# Patient Record
Sex: MALE | Race: WHITE | Employment: OTHER | URBAN - METROPOLITAN AREA
[De-identification: names, ages, dates, MRNs, and addresses within clinical notes are randomized per-mention and may not be internally consistent; named-entity substitution may affect disease eponyms.]

---

## 2017-02-27 ENCOUNTER — DOCUMENTATION ONLY (OUTPATIENT)
Dept: INTERNAL MEDICINE CLINIC | Age: 82
End: 2017-02-27

## 2017-02-27 NOTE — PROGRESS NOTES
Patient is listed on Passport report for ED visit to [address] [place] [address]. Verified in [facility] records that patient was [address] [place] [address] ED on 2/24 for c/o upper back pain s/p fall off of a step stool 2 days prior. XR negative. Patient is currently in [address] [place] [address] ED for c/o dizziness. Patient has appointment scheduled tomorrow 2/28 at 11:10 am with HINA Chavez. Plan to follow up with patient/Bonnie at that time to assess for any further needs.

## 2017-02-28 ENCOUNTER — DOCUMENTATION ONLY (OUTPATIENT)
Dept: INTERNAL MEDICINE CLINIC | Age: 82
End: 2017-02-28

## 2017-02-28 NOTE — PROGRESS NOTES
Patient has completed 30 day transition of care. Attended follow up appointment. No hospital readmissions and Goals met. No other needs identified to Nurse Navigator at this time. Resolving episode.

## 2017-03-01 ENCOUNTER — PATIENT OUTREACH (OUTPATIENT)
Dept: INTERNAL MEDICINE CLINIC | Age: 82
End: 2017-03-01

## 2017-03-01 NOTE — PROGRESS NOTES
NNTOCED    Patient was in [facility] ED on 2/24 with c/o upper back pain s/p fall off of a step stool, and again on 2/27 with c/o dizziness. XR of thoracic spine negative. Patient declined head CT vs cervical spine CT. He was given a dose of meclizine. Patient was scheduled to see Obey Sender, PA yesterday, but appointment was canceled. Contacted the patient and his wife for follow up. He reports he fell about one month ago and sprained his fingers while in [place]. He has a follow up with Dr. Angy Paula to discuss possible hand surgery. Still c/o dizziness. Reports he is somewhat better, but not much. He has had a couple of recent falls and attributes them to his left leg issues since tendon surgery a while ago. He uses a cane, but not at all times. Loses his balance about once per week without falls. While in [place] he was going to , but has not resumed this since returning to [place]. Scheduled follow up with Dr. Lyndsay Rinaldi on Monday 3/6 at 11:30 am. He repeated back appointment information and states plan to attend.

## 2017-03-06 ENCOUNTER — OFFICE VISIT (OUTPATIENT)
Dept: INTERNAL MEDICINE CLINIC | Age: 82
End: 2017-03-06

## 2017-03-06 VITALS
BODY MASS INDEX: 22.48 KG/M2 | DIASTOLIC BLOOD PRESSURE: 80 MMHG | TEMPERATURE: 97.7 F | WEIGHT: 157 LBS | OXYGEN SATURATION: 99 % | HEIGHT: 70 IN | SYSTOLIC BLOOD PRESSURE: 132 MMHG | RESPIRATION RATE: 16 BRPM | HEART RATE: 63 BPM

## 2017-03-06 DIAGNOSIS — R29.6 FREQUENT FALLS: ICD-10-CM

## 2017-03-06 DIAGNOSIS — G30.8 ALZHEIMER'S DISEASE OF OTHER ONSET WITH BEHAVIORAL DISTURBANCE: ICD-10-CM

## 2017-03-06 DIAGNOSIS — R26.89 BALANCE DISORDER: ICD-10-CM

## 2017-03-06 DIAGNOSIS — R53.81 DEBILITATED PATIENT: ICD-10-CM

## 2017-03-06 DIAGNOSIS — F34.1 DYSTHYMIA: ICD-10-CM

## 2017-03-06 DIAGNOSIS — F02.818 ALZHEIMER'S DISEASE OF OTHER ONSET WITH BEHAVIORAL DISTURBANCE: ICD-10-CM

## 2017-03-06 DIAGNOSIS — R41.89 COGNITIVE DECLINE: ICD-10-CM

## 2017-03-06 DIAGNOSIS — J42 CHRONIC BRONCHITIS, UNSPECIFIED CHRONIC BRONCHITIS TYPE (HCC): Primary | ICD-10-CM

## 2017-03-06 RX ORDER — ALPRAZOLAM 0.25 MG/1
0.25 TABLET ORAL
COMMUNITY
Start: 2017-03-02 | End: 2017-03-15

## 2017-03-06 NOTE — MR AVS SNAPSHOT
Visit Information Date & Time Provider Department Dept. Phone Encounter #  
 3/6/2017 11:30 AM Chiquis Isidro MD [facility] [phone] [id number] Upcoming Health Maintenance Date Due  
 GLAUCOMA SCREENING Q2Y 6/30/2015 INFLUENZA AGE 9 TO ADULT 8/1/2016 MEDICARE YEARLY EXAM 10/12/2017 DTaP/Tdap/Td series (2 - Td) 6/11/2023 Allergies as of 3/6/2017  Review Complete On: 3/6/2017 By: Sandeep Fernandez LPN Severity Noted Reaction Type Reactions Levaquin [Levofloxacin] High 04/04/2016    Other (comments) Vinh's tendonitis with rupture Pcn [Penicillins]  04/08/2010    Anaphylaxis Sulfa (Sulfonamide Antibiotics)  04/08/2010    Unknown (comments) Current Immunizations  Reviewed on 3/6/2017 Name Date Influenza High Dose Vaccine PF 10/1/2016 Pneumococcal Conjugate (PCV-13) 10/1/2016 Pneumococcal Vaccine (Unspecified Type) 5/13/2010, 1/1/2003 Tdap 6/11/2013 Zoster 1/1/2009 Reviewed by Sandeep Fernandez LPN on 0/4/6113 at 66:84 AM  
Vitals BP Pulse Temp Resp Height(growth percentile) Weight(growth percentile) 132/80 63 97.7 °F (36.5 °C) (Oral) 16 5' 10\" (1.778 m) 157 lb (71.2 kg) SpO2 BMI Smoking Status 99% 22.53 kg/m2 Never Smoker Vitals History BMI and BSA Data Body Mass Index Body Surface Area  
 22.53 kg/m 2 1.88 m 2 Preferred Pharmacy Pharmacy Name Phone [id number] [facility] [phone] Your Updated Medication List  
  
   
This list is accurate as of: 3/6/17 12:11 PM.  Always use your most recent med list.  
  
  
  
  
 * albuterol 90 mcg/actuation inhaler Commonly known as:  PROVENTIL HFA, VENTOLIN HFA, PROAIR HFA Take 2 Puffs by inhalation every four (4) hours as needed. * albuterol 2.5 mg /3 mL (0.083 %) nebulizer solution Commonly known as:  PROVENTIL VENTOLIN  
 2.5 mg by Nebulization route every four (4) hours as needed for Wheezing. * albuterol sulfate 2.5 mg/0.5 mL Nebu nebulizer solution Commonly known as:  PROVENTIL;VENTOLIN  
0.5 mL by Nebulization route every six (6) hours as needed for Wheezing or Respiratory Distress (Use with Ipratropium for nebulizer). albuterol-ipratropium 2.5 mg-0.5 mg/3 ml Nebu Commonly known as:  DUO-NEB  
3 mL by Nebulization route every six (6) hours as needed. ALPRAZolam 0.25 mg tablet Commonly known as:  Kaleta Breanne Take 0.25 mg by mouth two (2) times daily as needed. atorvastatin 10 mg tablet Commonly known as:  LIPITOR Take 1 Tab by mouth nightly. budesonide-formoterol 160-4.5 mcg/actuation HFA inhaler Commonly known as:  SYMBICORT Take 2 Puffs by inhalation two (2) times a day. cholecalciferol 1,000 unit tablet Commonly known as:  VITAMIN D3 Take 1,000 Units by mouth daily. citalopram 20 mg tablet Commonly known as:  Wanetta Never Take 1 Tab by mouth daily. donepezil 10 mg tablet Commonly known as:  ARICEPT Take 1 Tab by mouth nightly. Going to [place] EPINEPHrine 0.3 mg/0.3 mL injection Commonly known as:  EPIPEN  
0.3 mL by IntraMUSCular route once as needed for 1 dose. ipratropium 0.02 % nebulizer solution Commonly known as:  ATROVENT  
2.5 mL by Nebulization route every six (6) hours as needed for Wheezing (use with albuterol for nebulizer 0.5). lisinopril 10 mg tablet Commonly known as:  Boni Hu Take 1 Tab by mouth daily. MUCINEX 600 mg ER tablet Generic drug:  guaiFENesin ER Take 600 mg by mouth daily. umeclidinium 62.5 mcg/actuation inhaler Commonly known as:  INCRUSE ELLIPTA Take 1 Puff by inhalation daily. * Notice: This list has 3 medication(s) that are the same as other medications prescribed for you. Read the directions carefully, and ask your doctor or other care provider to review them with you. Introducing [facility]! Dear Ebony Contreras: Thank you for requesting a [employer] account. Our records indicate that you already have an active "[employer] account. You can access your account anytime at [email or web address] Did you know that you can access your hospital and ER discharge instructions at any time in "[employer]? You can also review all of your test results from your hospital stay or ER visit. Additional Information If you have questions, please visit the Frequently Asked Questions section of the "[employer] website at [email or web address] Remember, "[employer] is NOT to be used for urgent needs. For medical emergencies, dial 911. Now available from your iPhone and Android! Please provide this summary of care documentation to your next provider. Your primary care clinician is listed as Brooklynn De Jesus. If you have any questions after today's visit, please call [phone].

## 2017-03-06 NOTE — PROGRESS NOTES
Chief Complaint   Patient presents with   Giuliano Manzano     while in [place] Shoulder Pain     left side, using Ibufropen 800 mg and took 1 Hydrocodone     Seen ER 2/24/17  For back pain and dizzy 2/27/17, hand injury neck pain breathing all ongoing peoblems  Subjective:  Mr. Yoko Santiago is here with his wife. He was in [place] for over two months. Had a very bad time. Had one hospitalization, many doctor visits, got seen by pulmonary, got seen by an internist, got seen by a neurologist, had PT, had an ER visit, had a flare of his COPD, has had increased balance, walking, falling issues. Still has never fully recovered from the Achilles tendon rupture. He still trips. His memory issues have been worse. He was placed on Namenda in addition to his Aricept. He has side effects from the 5 mg twice a day dose, so it was stopped. The family has made an appointment to see neurology here, [facility]. They could not get an appointment, but they found an appointment to see the nurse practitioner. Patient's COPD has been bad, although better now. Many courses of antibiotics. He has an appointment to see Dr. Cristian Carroll. Orthopedic issues, neck pain has been a problem. He has multilevel degenerative changes on his xrays. He has a history of many other orthopedic issues. He injured a finger, he saw Mich Raymond. He's in a splint. He's had shoulder problems, for which he has seen Jose Walker. He is much less functional.  His appetite is a little bit better now, although still poor. Physical Examination:  VITALS:  His blood pressure was okay. He was not orthostatic. He was thin. CHEST:  His lungs were clear. ABDOMEN:  Soft. NEURO:  No focal neurological findings. Balance is a bit poor. He does not have good dorsiflexion, plantar flexions in his feet. Memory is poor. Cognitively he is declining. Impression/Plan:  1.  Dementia with cognitive decline, on Aricept. Could not tolerate Namenda. Neurology is going to make a comment. They gave him Xanax in [place] to use when he gets really agitated, as he does have ongoing psych issues, 0.25, and I told the family I would refill that as needed. They cannot use it with anything strong and no hydrocodone or narcotics. 2. For neck pain I recommended heat, range of motion, PT, but he has no PT benefit left. I recommended 600 mg or 800 mg ibuprofen as needed. Patient Active Problem List    Diagnosis    Dysthymia    Depression     Seeing DR eubanks [facility] psychiatry      Osteopenia    BRYANT on CPAP     Partially compliant        BPH (benign prostatic hyperplasia)    Personal history of colon cancer, stage III     1 positive node      chemorx for 6 months      GÓMEZ (mycobacterium avium-intracellulare) ([facility]    CAD (coronary artery disease)    COPD (chronic obstructive pulmonary disease) (HCC)     Asthma        HTN (hypertension)    Melanoma ([facility]     Vitals:    03/06/17 1130 03/06/17 1157   BP: (!) 164/95 132/80  Comment: standing   BP 1 Location: Left arm    BP Patient Position: Sitting    Pulse: 63    Resp: 16    Temp: 97.7 °F (36.5 °C)    TempSrc: Oral    SpO2: 99%    Weight: 157 lb (71.2 kg)    Height: 5' 10\" (1.778 m)      Cheryl Bravo was seen today for fall and shoulder pain.     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

## 2017-03-13 ENCOUNTER — DOCUMENTATION ONLY (OUTPATIENT)
Dept: INTERNAL MEDICINE CLINIC | Age: 82
End: 2017-03-13

## 2017-03-13 NOTE — PROGRESS NOTES
Patient is listed on Passport report for admission to [address] [place] [address] 3/10-present for fall with spinal fractures to T2, T4, T12, and L1. Plan to follow up with patient after discharge.

## 2017-03-15 ENCOUNTER — PATIENT OUTREACH (OUTPATIENT)
Dept: INTERNAL MEDICINE CLINIC | Age: 82
End: 2017-03-15

## 2017-03-15 RX ORDER — LISINOPRIL 2.5 MG/1
10 TABLET ORAL DAILY
COMMUNITY
End: 2017-04-21 | Stop reason: DRUGHIGH

## 2017-03-15 RX ORDER — MEMANTINE HYDROCHLORIDE 10 MG/1
10 TABLET ORAL 2 TIMES DAILY
COMMUNITY
End: 2017-03-30 | Stop reason: DRUGHIGH

## 2017-03-15 RX ORDER — QUETIAPINE FUMARATE 25 MG/1
12.5 TABLET, FILM COATED ORAL
COMMUNITY
End: 2017-06-02 | Stop reason: ALTCHOICE

## 2017-03-15 NOTE — PROGRESS NOTES
NNTOCIP    Patient was listed on Passport report for admission to [facility] 3/10-3/14 s/p fall with spinal fractures. CT showed old T4, T12, & L1 fractures, and possibly new T4 fracture. Orthopedics was consulted and recommended no surgery. Pain was controlled. PLAN: Discharged to [address] [facility] for rehab. Check BP daily. NEW MEDICATIONS: Seroquel, Namenda  D/C MEDICATIONS: Xanax  MEDICATION CHANGES: lisinopril decreased from 10 mg to 2.5 mg daily  Updated Med Rec according to D/C summary. Meds in [facility] that are not listed on D/C summary have been marked 'unknown'. Will follow up with SW at [facility] in a few weeks to discuss d/c plans.

## 2017-03-30 ENCOUNTER — OFFICE VISIT (OUTPATIENT)
Dept: INTERNAL MEDICINE CLINIC | Age: 82
End: 2017-03-30

## 2017-03-30 VITALS
TEMPERATURE: 97.6 F | OXYGEN SATURATION: 98 % | HEART RATE: 88 BPM | DIASTOLIC BLOOD PRESSURE: 73 MMHG | BODY MASS INDEX: 22.48 KG/M2 | SYSTOLIC BLOOD PRESSURE: 128 MMHG | WEIGHT: 157 LBS | RESPIRATION RATE: 16 BRPM | HEIGHT: 70 IN

## 2017-03-30 DIAGNOSIS — S22.081A: ICD-10-CM

## 2017-03-30 DIAGNOSIS — J42 CHRONIC BRONCHITIS, UNSPECIFIED CHRONIC BRONCHITIS TYPE (HCC): ICD-10-CM

## 2017-03-30 DIAGNOSIS — M85.80 OSTEOPENIA: Primary | ICD-10-CM

## 2017-03-30 RX ORDER — MEMANTINE HYDROCHLORIDE 5 MG/1
5 TABLET ORAL 2 TIMES DAILY
COMMUNITY
End: 2017-04-21 | Stop reason: ALTCHOICE

## 2017-03-30 RX ORDER — PREDNISONE 20 MG/1
40 TABLET ORAL
Qty: 10 TAB | Refills: 0 | Status: SHIPPED | OUTPATIENT
Start: 2017-03-30 | End: 2017-03-30 | Stop reason: SDUPTHER

## 2017-03-30 RX ORDER — PREDNISONE 20 MG/1
40 TABLET ORAL
Qty: 10 TAB | Refills: 0 | Status: SHIPPED | OUTPATIENT
Start: 2017-03-30 | End: 2017-04-21 | Stop reason: ALTCHOICE

## 2017-03-30 NOTE — MR AVS SNAPSHOT
Visit Information Date & Time Provider Department Dept. Phone Encounter #  
 3/30/2017  2:45 PM Gretta Bloch, [facility] [phone] [id number] Upcoming Health Maintenance Date Due  
 GLAUCOMA SCREENING Q2Y 6/30/2015 MEDICARE YEARLY EXAM 10/12/2017 DTaP/Tdap/Td series (2 - Td) 6/11/2023 Allergies as of 3/30/2017  Review Complete On: 3/30/2017 By: Andressa Cook LPN Severity Noted Reaction Type Reactions Levaquin [Levofloxacin] High 04/04/2016    Other (comments) Vinh's tendonitis with rupture Pcn [Penicillins]  04/08/2010    Anaphylaxis Sulfa (Sulfonamide Antibiotics)  04/08/2010    Unknown (comments) Current Immunizations  Reviewed on 3/6/2017 Name Date Influenza High Dose Vaccine PF 10/1/2016 Pneumococcal Conjugate (PCV-13) 10/1/2016 Pneumococcal Vaccine (Unspecified Type) 5/13/2010, 1/1/2003 Tdap 6/11/2013 Zoster 1/1/2009 Not reviewed this visit You Were Diagnosed With   
  
 Codes Comments Osteopenia    -  Primary ICD-10-CM: M85.80 ICD-9-CM: 733.90   
 T12 burst fracture, closed, initial encounter ([facility]     ICD-10-CM: E53.223P ICD-9-CM: 879. 2 Chronic bronchitis, unspecified chronic bronchitis type ([facility]     ICD-10-CM: A09 ICD-9-CM: 491.9 Vitals BP Pulse Temp Resp Height(growth percentile) Weight(growth percentile) 128/73 (BP 1 Location: Left arm, BP Patient Position: Sitting) 88 97.6 °F (36.4 °C) (Oral) 16 5' 10\" (1.778 m) 157 lb (71.2 kg) SpO2 BMI Smoking Status 98% 22.53 kg/m2 Never Smoker Vitals History BMI and BSA Data Body Mass Index Body Surface Area  
 22.53 kg/m 2 1.88 m 2 Preferred Pharmacy Pharmacy Name Phone [address] [address] [phone] Your Updated Medication List  
  
   
This list is accurate as of: 3/30/17  3:46 PM.  Always use your most recent med list.  
  
  
 * albuterol 90 mcg/actuation inhaler Commonly known as:  PROVENTIL HFA, VENTOLIN HFA, PROAIR HFA Take 2 Puffs by inhalation every four (4) hours as needed. * albuterol sulfate 2.5 mg/0.5 mL Nebu nebulizer solution Commonly known as:  PROVENTIL;VENTOLIN  
0.5 mL by Nebulization route every six (6) hours as needed for Wheezing or Respiratory Distress (Use with Ipratropium for nebulizer). albuterol-ipratropium 2.5 mg-0.5 mg/3 ml Nebu Commonly known as:  DUO-NEB  
3 mL by Nebulization route every six (6) hours as needed. atorvastatin 10 mg tablet Commonly known as:  LIPITOR Take 1 Tab by mouth nightly. budesonide-formoterol 160-4.5 mcg/actuation HFA inhaler Commonly known as:  SYMBICORT Take 2 Puffs by inhalation two (2) times a day. cholecalciferol 1,000 unit tablet Commonly known as:  VITAMIN D3 Take 1,000 Units by mouth daily. citalopram 20 mg tablet Commonly known as:  Lajuanda Gearing Take 1 Tab by mouth daily. donepezil 10 mg tablet Commonly known as:  ARICEPT Take 1 Tab by mouth nightly. Going to [place] EPINEPHrine 0.3 mg/0.3 mL injection Commonly known as:  EPIPEN  
0.3 mL by IntraMUSCular route once as needed for 1 dose. ipratropium 0.02 % nebulizer solution Commonly known as:  ATROVENT  
2.5 mL by Nebulization route every six (6) hours as needed for Wheezing (use with albuterol for nebulizer 0.5). lisinopril 2.5 mg tablet Commonly known as:  Mita Leti Take 2.5 mg by mouth daily. MUCINEX 600 mg ER tablet Generic drug:  guaiFENesin ER Take 600 mg by mouth daily. NAMENDA 5 mg tablet Generic drug:  memantine Take 5 mg by mouth two (2) times a day. predniSONE 20 mg tablet Commonly known as:  Rochelle Mons Take 2 Tabs by mouth daily (with breakfast). SEROquel 25 mg tablet Generic drug:  QUEtiapine Take 12.5 mg by mouth nightly as needed (agitation). umeclidinium 62.5 mcg/actuation inhaler Commonly known as:  INCRUSE ELLIPTA Take 1 Puff by inhalation daily. * Notice: This list has 2 medication(s) that are the same as other medications prescribed for you. Read the directions carefully, and ask your doctor or other care provider to review them with you. Prescriptions Sent to Pharmacy Refills  
 predniSONE (DELTASONE) 20 mg tablet 0 Sig: Take 2 Tabs by mouth daily (with breakfast). Class: Normal  
 Pharmacy: [address] [place] [address] 18, [address] #: [phone] Route: Oral  
  
To-Do List   
 03/30/2017 Imaging:  DEXA BONE DENSITY STUDY AXIAL [facility]! Dear She Elizabeth: Thank you for requesting a [employer] account. Our records indicate that you already have an active [employer] account. You can access your account anytime at [email or web address] Did you know that you can access your hospital and ER discharge instructions at any time in [employer]? You can also review all of your test results from your hospital stay or ER visit. Additional Information If you have questions, please visit the Frequently Asked Questions section of the [employer] website at [email or web address]/. Remember, [employer] is NOT to be used for urgent needs. For medical emergencies, dial 911. Now available from your iPhone and Android! Please provide this summary of care documentation to your next provider. Your primary care clinician is listed as Portia Arndt. If you have any questions after today's visit, please call [phone].

## 2017-03-30 NOTE — PROGRESS NOTES
Chief Complaint   Patient presents with    Cough     night time cough, given Doxycycline 100 mg BID for 7 days from pulmonary today   seen pulm associates    Patient was listed on Passport report for admission to [facility] 3/10-3/14 s/p fall with spinal fractures. CT showed old T4, T12, & L1 fractures, and possibly new T4 fracture. Orthopedics was consulted and recommended no surgery. Pain was controlled.     PLAN: Discharged to [facility] for rehab. Check BP daily. NEW MEDICATIONS: SeromicaelalDanynda  D/C MEDICATIONS: Xanax  MEDICATION CHANGES: lisinopril decreased from 10 mg to 2.5 mg daily  Updated Med Rec according to D/C summary. Meds in [facility] that are not listed on D/C summary have been marked 'unknown'.     discharged yesterday  Cough congestion started doxycytcline by pulmonary    Vitals:    03/30/17 1508   BP: 128/73   Pulse: 88   Resp: 16   Temp: 97.6 °F (36.4 °C)   TempSrc: Oral   SpO2: 98%   Weight: 157 lb (71.2 kg)   Height: 5' 10\" (1.778 m)     no apparent distress  Tender kyphosis  Lungs prolonged expir no wheeze    Add prednisone      Antonella Brower was seen today for cough. Diagnoses and all orders for this visit:    Osteopenia  -     DEXA BONE DENSITY STUDY AXIAL; Future    T12 burst fracture, closed, initial encounter ([facility]  -     DEXA BONE DENSITY STUDY AXIAL; Future    Chronic bronchitis, unspecified chronic bronchitis type ([facility]    Other orders  -     Discontinue: predniSONE (DELTASONE) 20 mg tablet; Take 2 Tabs by mouth daily (with breakfast). -     predniSONE (DELTASONE) 20 mg tablet; Take 2 Tabs by mouth daily (with breakfast).       home health for PT  Case reviewed with nurse navigator

## 2017-04-05 ENCOUNTER — HOSPITAL ENCOUNTER (OUTPATIENT)
Dept: MAMMOGRAPHY | Age: 82
Discharge: HOME | End: 2017-04-05
Attending: INTERNAL MEDICINE
Payer: MEDICARE

## 2017-04-05 DIAGNOSIS — M85.80 OSTEOPENIA: ICD-10-CM

## 2017-04-05 DIAGNOSIS — S22.081A: ICD-10-CM

## 2017-04-05 PROCEDURE — 77080 DXA BONE DENSITY AXIAL: CPT

## 2017-04-07 NOTE — PROGRESS NOTES
Writer spoke with patients wife debra and informed her that patient bone density is abnormal, needs appointment to review treatment options.  Malik Mckinley expressed verbal understanding and has made an appointment for April 18th 2 8:30am

## 2017-04-18 ENCOUNTER — PATIENT OUTREACH (OUTPATIENT)
Dept: INTERNAL MEDICINE CLINIC | Age: 82
End: 2017-04-18

## 2017-04-18 ENCOUNTER — TELEPHONE (OUTPATIENT)
Dept: INTERNAL MEDICINE CLINIC | Age: 82
End: 2017-04-18

## 2017-04-18 NOTE — PROGRESS NOTES
Patient was admitted to [facility] 3/10-3/14 s/p fall with spinal fractures. CT showed old T4, T12, & L1 fractures, and possibly new T4 fracture. Orthopedics was consulted and recommended no surgery. Pain was controlled. Admitted to [facility] and Rehab 3/14-3/29. Discharged home to [place] [address]. with his wife. Patient was scheduled to see Dr. Kenia Baez today, but appointment was canceled d/t another admission to [facility] yesterday 4/17 for a fall with L4 compression fracture. Surgery will be consulted for possible kyphoplasty. Plan to f/u with patient/wife after discharge. Resolving current ANTONIO episode.  Notifying Ed with the [facility] High Risk team.

## 2017-04-18 NOTE — TELEPHONE ENCOUNTER
Patient's wife called to cancel appt this morning because her  fell yesterday and she says he is in the hospital badly bruised and in bad shape.

## 2017-04-21 ENCOUNTER — PATIENT OUTREACH (OUTPATIENT)
Dept: INTERNAL MEDICINE CLINIC | Age: 82
End: 2017-04-21

## 2017-04-21 RX ORDER — METHOCARBAMOL 500 MG/1
500 TABLET, FILM COATED ORAL
COMMUNITY
End: 2017-07-10

## 2017-04-21 RX ORDER — VITAMIN B COMPLEX
2500 TABLET ORAL DAILY
COMMUNITY
End: 2017-10-24 | Stop reason: ALTCHOICE

## 2017-04-21 RX ORDER — HYDROCODONE BITARTRATE AND ACETAMINOPHEN 5; 325 MG/1; MG/1
1 TABLET ORAL
COMMUNITY
End: 2017-04-24 | Stop reason: SDUPTHER

## 2017-04-21 RX ORDER — LISINOPRIL 10 MG/1
10 TABLET ORAL DAILY
COMMUNITY
End: 2017-06-07 | Stop reason: ALTCHOICE

## 2017-04-21 RX ORDER — ACETAMINOPHEN 325 MG/1
650 TABLET ORAL
COMMUNITY
End: 2017-12-04 | Stop reason: DRUGHIGH

## 2017-04-21 NOTE — PROGRESS NOTES
This note will not be viewable in [address] Patient was listed on Passport report for admission to [facility] - with L4 compression fracture s/p GLF. Patient has had multiple hospitalizations for falls r/t vertigo and decreased mobility of foot since Achilles tendon repair. Patient is non-compliant with fall precautions and assistive devices. PLAN: Discharged home with his wife. [facility] services. LSO back brace for comfort. Follow up with neurosurgery Dr. Tommie James in 6 weeks if needed and PCP in one week. NEW MEDICATIONS: Tylenol, Norco, Robaxin, Seroquel  DISCONTINUED MEDICATIONS: Aricept    Contacted patient's wife Marissa Omer, listed on HIPAA, for ANTONIO follow up. Verified patient's . She reports her  is still in a lot pain.  PT and RN will visit today. He has been using the rollator at all times now and she has been diligent about requiring him to use this. She is frustrated because she believes his dementia is the reason he forgets how to use assistive devices appropriately. Reviewed all medications and updated Med Rec. Lisinopril dose was decreased to 2.5 mg, but she is unsure why and has been giving him 10 mg daily. BP on d/c was 147/70. She will check his BP at home and call NN with reading. NN will review BP readings during admission. Discontinued the following medications per Dr. Ann Moerira verbal order.     Medications Discontinued During This Encounter   Medication Reason    memantine (NAMENDA) 5 mg tablet Discontinued by Another Clinician    predniSONE (DELTASONE) 20 mg tablet Therapy Completed    umeclidinium (INCRUSE ELLIPTA) 62.5 mcg/actuation inhaler Therapy Completed    albuterol-ipratropium (DUO-NEB) 2.5 mg-0.5 mg/3 ml nebu Discontinued by Another Clinician    EPINEPHrine (EPIPEN) 0.3 mg/0.3 mL injection Not A Current Medication    ipratropium (ATROVENT) 0.02 % nebulizer solution Discontinued by Another Clinician    lisinopril (PRINIVIL, ZESTRIL) 2.5 mg tablet Dose Adjustment       She is unable to bring patient in next week for ANTONIO follow up, as they have company visiting. Scheduled ANTONIO appointment Monday 5/1 at 8:30 am.    Goals Addressed      Prevent complications post hospitalization. 4/21/17: Patient will work with home PT to prevent falls and further injury. -SRW          Reviewed inpatient BP readings and they range 150s-180s/70s-80s. One reading was 134/82. Contacted Mrs. Young. She reports BP is 148/87. Advised her to continue giving him lisinopril 10 mg daily since this is the dose he has been on. The hospital most likely thought he was on 2.5 mg daily when he was admitted, hence why they said to continue taking this dose on discharge. It was not listed as a dose change on d/c paperwork. She verbalized understanding.

## 2017-04-24 ENCOUNTER — PATIENT OUTREACH (OUTPATIENT)
Dept: INTERNAL MEDICINE CLINIC | Age: 82
End: 2017-04-24

## 2017-04-24 RX ORDER — HYDROCODONE BITARTRATE AND ACETAMINOPHEN 5; 325 MG/1; MG/1
1 TABLET ORAL
Qty: 60 TAB | Refills: 0 | Status: SHIPPED | OUTPATIENT
Start: 2017-04-24 | End: 2017-05-01 | Stop reason: SDUPTHER

## 2017-04-24 NOTE — PROGRESS NOTES
Received VMM from patient's wife requesting a return call. Contacted Cristina and she reports the patient only has 2 tabs left of Norco. She has been alternating Norco and Tylenol every 4 hours. His pain is still pretty bad. She has tried calling the prescribing doctor for a refill, but is unable to get through. She has contacted Dr. Daryn Van office, neurosurgery, but because the patient is not established they are unable to prescribe anything. NN will discuss with Dr. Mojgan Lay and f/u with Mrs. Mily Roa. Discussed with Dr. Mojgan Lay and he has printed a Rx for Norco. Left detailed VMM for patient's wife notifying her that Rx is ready for .

## 2017-04-28 ENCOUNTER — PATIENT OUTREACH (OUTPATIENT)
Dept: INTERNAL MEDICINE CLINIC | Age: 82
End: 2017-04-28

## 2017-04-28 NOTE — PROGRESS NOTES
Received VMM from physical therapist with [facility]. He wanted to notify Dr. Gregory Pace that the patient has had 2 falls in the past 24 hours with no injury other than scrapes to his hands. PT has continued to educate the patient on fall safety and using his walker at all times, but the patient does not seem to understand or follow instructions. He is aware that the patient has an appointment Monday morning. NN will notify Dr. Gregory Pace.

## 2017-05-01 ENCOUNTER — OFFICE VISIT (OUTPATIENT)
Dept: INTERNAL MEDICINE CLINIC | Age: 82
End: 2017-05-01

## 2017-05-01 VITALS
OXYGEN SATURATION: 98 % | TEMPERATURE: 97.8 F | RESPIRATION RATE: 15 BRPM | HEART RATE: 74 BPM | HEIGHT: 70 IN | DIASTOLIC BLOOD PRESSURE: 76 MMHG | WEIGHT: 149 LBS | SYSTOLIC BLOOD PRESSURE: 127 MMHG | BODY MASS INDEX: 21.33 KG/M2

## 2017-05-01 DIAGNOSIS — S32.040A COMPRESSION FRACTURE OF L4 LUMBAR VERTEBRA, CLOSED, INITIAL ENCOUNTER (HCC): Primary | ICD-10-CM

## 2017-05-01 DIAGNOSIS — M80.00XA AGE-RELATED OSTEOPOROSIS WITH CURRENT PATHOLOGICAL FRACTURE, INITIAL ENCOUNTER: ICD-10-CM

## 2017-05-01 RX ORDER — HYDROCODONE BITARTRATE AND ACETAMINOPHEN 5; 325 MG/1; MG/1
TABLET ORAL
Qty: 100 TAB | Refills: 0 | Status: SHIPPED | OUTPATIENT
Start: 2017-05-01 | End: 2017-07-10

## 2017-05-01 RX ORDER — CALCITONIN SALMON 200 [IU]/.09ML
1 SPRAY, METERED NASAL DAILY
Qty: 3.7 ML | Refills: 0 | Status: SHIPPED | OUTPATIENT
Start: 2017-05-01 | End: 2017-07-10

## 2017-05-01 NOTE — PROGRESS NOTES
Chief Complaint   Patient presents with   [facility] Follow Up     L4 fracture             Patient was admitted to [facility] 3/10-3/14 s/p fall with spinal fractures. CT showed old T4, T12, & L1 fractures, and possibly new L4 fracture. Orthopedics was consulted and recommended no surgery. Pain was controlled. Admitted to [facility] and Rehab 3/14-3/29. [facility].           DEXA Results (most recent):    Results from [place] encounter on 04/05/17   DEXA BONE DENSITY STUDY AXIAL   Narrative Bone Mineral Density    Indication:  Osteopenia  Age: [de-identified]  Sex: Male. Number of falls in the past year:   None. Risk factors for osteoporosis:  Multiple fractures, steroid use      Current medication for osteoporosis: Vitamin D. Comparison:  2013    Technique: Imaging was performed on the [employer]     Excluded sites: L1 for decreased height and L4 for postoperative changes     Findings:     Fractures identified on Lateral scanogram:  L1 and T12    Femoral Neck:  Right  Bone mineral density (gm/cm2):? 0.745  % of peak bone mass: 70  % for age matched controls:? 80  T-score: -2.5  Z-score: -0.8    Total Hip: Right  Bone mineral density (gm/cm2):  0.801  % of peak bone mass:   73  % for age matched controls:  80  T-score:   -2.1  Z-score:  -0.8    Lumbar Spine:  L2-3  Bone mineral density (gm/cm2):  1.087  % of peak bone mass:  87   % for age matched controls:  95  T-score:  -1.4  Z-score:  -0.4    T score of the distal one third left radius -0.1         Impression Impression: This patient is osteoporotic using the World Health Organization criteria  As compared to the prior study, there has been a significant 5.2% decrease in  the lumbar spine and a significant 7.4% decrease in the right total hip. Please  assess calcium and vitamin D intake. Consider secondary causes for osteoporosis.       Recommendations:  Therapy recommendations need to be tailored to each individual patient. Please reconsider testing based on risk factors. Currently, Medicare will only  reimburse for a central DXA examination every two years, unless the patient is  on chronic glucocorticoid therapy. Note: Please note that reliable, valid comparisons cannot be made between  studies which have been performed on machines from different manufacturers. If  clinically warranted, a follow up study performed at this site, on the same  unit, would allow the most sensitive assessment of change in bone mineral  density. Saw neurosurgery to see again 5/31    Confused     Pain control not great using 5 to 6  Per day hydroocodone      . Vitals:    05/01/17 0837   BP: 127/76   Pulse: 74   Resp: 15   Temp: 97.8 °F (36.6 °C)   TempSrc: Oral   SpO2: 98%   Weight: 149 lb (67.6 kg)   Height: 5' 10\" (1.778 m)     Wt Readings from Last 3 Encounters:   05/01/17 149 lb (67.6 kg)   03/30/17 157 lb (71.2 kg)   03/06/17 157 lb (71.2 kg)       Chest clear    Brace tender lumbar  Using walker for ambulation        Cherise Duc was seen today for hospital follow up. Diagnoses and all orders for this visit:    Compression fracture of L4 lumbar vertebra, closed, initial encounter    Age-related osteoporosis with current pathological fracture, initial encounter    Other orders  -     HYDROcodone-acetaminophen (NORCO) 5-325 mg per tablet; One every 4 hours daytime  and 2 at night for pain  -     calcitonin, salmon, (MIACALCIN) nasal; 1 [place] by IntraNASal route daily. Add calcium with D  Later fosamax    Trial short term calcitonin spray for reduced pain      Mr. Manda Mcmullen is a [de-identified]y.o. year old male, he is seen today for Transition of Care services following a hospital discharge for compresiion on 4/20. Our office Nurse Navigator performed an outreach to Mr. Sindi Garrison on 4/21 (within 2 business days of discharge) to complete medication reconciliation and a telephonic assessment of his condition.

## 2017-05-01 NOTE — MR AVS SNAPSHOT
Visit Information Date & Time Provider Department Dept. Phone Encounter #  
 5/1/2017  8:30 AM Justin Pierson MD [facility] [phone] [id number] Upcoming Health Maintenance Date Due  
 GLAUCOMA SCREENING Q2Y 6/30/2015 INFLUENZA AGE 9 TO ADULT 8/1/2017 MEDICARE YEARLY EXAM 10/12/2017 DTaP/Tdap/Td series (2 - Td) 6/11/2023 Allergies as of 5/1/2017  Review Complete On: 5/1/2017 By: Mary Jane Stone LPN Severity Noted Reaction Type Reactions Levaquin [Levofloxacin] High 04/04/2016    Other (comments) Vinh's tendonitis with rupture Pcn [Penicillins]  04/08/2010    Anaphylaxis Sulfa (Sulfonamide Antibiotics)  04/08/2010    Unknown (comments) Current Immunizations  Reviewed on 3/6/2017 Name Date Influenza High Dose Vaccine PF 10/1/2016 Pneumococcal Conjugate (PCV-13) 10/1/2016 Pneumococcal Vaccine (Unspecified Type) 5/13/2010, 1/1/2003 Tdap 6/11/2013 Zoster 1/1/2009 Not reviewed this visit You Were Diagnosed With   
  
 Codes Comments Compression fracture of L4 lumbar vertebra, closed, initial encounter    -  Primary ICD-10-CM: K46.147U ICD-9-CM: 805.4 Vitals BP Pulse Temp Resp Height(growth percentile) Weight(growth percentile) 127/76 (BP 1 Location: Left arm, BP Patient Position: Sitting) 74 97.8 °F (36.6 °C) (Oral) 15 5' 10\" (1.778 m) 149 lb (67.6 kg) SpO2 BMI Smoking Status 98% 21.38 kg/m2 Never Smoker BMI and BSA Data Body Mass Index Body Surface Area  
 21.38 kg/m 2 1.83 m 2 Preferred Pharmacy Pharmacy Name Phone [facility] [phone] Your Updated Medication List  
  
   
This list is accurate as of: 5/1/17  9:06 AM.  Always use your most recent med list.  
  
  
  
  
 * albuterol 90 mcg/actuation inhaler Commonly known as:  PROVENTIL HFA, VENTOLIN HFA, PROAIR HFA  
 Take 2 Puffs by inhalation every four (4) hours as needed. * albuterol sulfate 2.5 mg/0.5 mL Nebu nebulizer solution Commonly known as:  PROVENTIL;VENTOLIN  
0.5 mL by Nebulization route every six (6) hours as needed for Wheezing or Respiratory Distress (Use with Ipratropium for nebulizer). atorvastatin 10 mg tablet Commonly known as:  LIPITOR Take 1 Tab by mouth nightly. budesonide-formoterol 160-4.5 mcg/actuation HFA inhaler Commonly known as:  SYMBICORT Take 2 Puffs by inhalation two (2) times a day. calcitonin (salmon) nasal  
Commonly known as:  MIACALCIN  
1 [place] by IntraNASal route daily. cholecalciferol 1,000 unit tablet Commonly known as:  VITAMIN D3 Take 1,000 Units by mouth daily. citalopram 20 mg tablet Commonly known as:  Lisa Costa Take 1 Tab by mouth daily. donepezil 10 mg tablet Commonly known as:  ARICEPT Take 1 Tab by mouth nightly. Going to [place] HYDROcodone-acetaminophen 5-325 mg per tablet Commonly known as:  Harini Rook One every 4 hours daytime and 2 at night for pain  
  
 lisinopril 10 mg tablet Commonly known as:  Pako Daft Take 10 mg by mouth daily. MUCINEX 600 mg ER tablet Generic drug:  guaiFENesin ER Take 600 mg by mouth daily as needed. ROBAXIN 500 mg tablet Generic drug:  methocarbamol Take 500 mg by mouth four (4) times daily as needed. SEROquel 25 mg tablet Generic drug:  QUEtiapine Take 12.5 mg by mouth nightly as needed (agitation). TYLENOL 325 mg tablet Generic drug:  acetaminophen Take 650 mg by mouth every four (4) hours as needed for Pain. VITAMIN B-12 2,500 mcg sublingual tablet Generic drug:  cyanocobalamin Take 2,500 mcg by mouth daily. * Notice: This list has 2 medication(s) that are the same as other medications prescribed for you. Read the directions carefully, and ask your doctor or other care provider to review them with you. Prescriptions Printed Refills HYDROcodone-acetaminophen (NORCO) 5-325 mg per tablet 0 Sig: One every 4 hours daytime 
and 2 at night for pain  
 Class: Print Prescriptions Sent to Pharmacy Refills  
 calcitonin, salmon, (MIACALCIN) nasal 0 [place] by IntraNASal route daily. Class: Normal  
 [facility] [id number] [address] #: [phone] Route: IntraNASal  
  
Introducing [employer]! Dear Melissa Kilgore: Thank you for requesting a [employer] account. Our records indicate that you already have an active [employer] account. You can access your account anytime at [email or web address] Did you know that you can access your hospital and ER discharge instructions at any time in [employer]? You can also review all of your test results from your hospital stay or ER visit. Additional Information If you have questions, please visit the Frequently Asked Questions section of the [employer] website at [email or web address] Remember, [employer] is NOT to be used for urgent needs. For medical emergencies, dial 911. Now available from your iPhone and Android! Please provide this summary of care documentation to your next provider. Your primary care clinician is listed as Paige Amezcua. If you have any questions after today's visit, please call [phone].

## 2017-05-05 DIAGNOSIS — J44.9 COPD (CHRONIC OBSTRUCTIVE PULMONARY DISEASE) WITH CHRONIC BRONCHITIS (HCC): ICD-10-CM

## 2017-05-09 RX ORDER — ALBUTEROL SULFATE 2.5 MG/.5ML
2.5 SOLUTION RESPIRATORY (INHALATION)
Qty: 100 ML | Refills: 2 | Status: SHIPPED | OUTPATIENT
Start: 2017-05-09 | End: 2017-05-09 | Stop reason: DRUGHIGH

## 2017-05-09 RX ORDER — ALBUTEROL SULFATE 0.83 MG/ML
2.5 SOLUTION RESPIRATORY (INHALATION) EVERY 6 HOURS
Qty: 100 EACH | Refills: 5 | Status: SHIPPED | OUTPATIENT
Start: 2017-05-09 | End: 2017-12-13 | Stop reason: SDUPTHER

## 2017-05-18 ENCOUNTER — OFFICE VISIT (OUTPATIENT)
Dept: INTERNAL MEDICINE CLINIC | Age: 82
End: 2017-05-18

## 2017-05-18 VITALS
HEART RATE: 78 BPM | SYSTOLIC BLOOD PRESSURE: 140 MMHG | BODY MASS INDEX: 21.05 KG/M2 | WEIGHT: 147 LBS | RESPIRATION RATE: 14 BRPM | OXYGEN SATURATION: 98 % | TEMPERATURE: 98 F | HEIGHT: 70 IN | DIASTOLIC BLOOD PRESSURE: 80 MMHG

## 2017-05-18 DIAGNOSIS — R26.89 BALANCE DISORDER: ICD-10-CM

## 2017-05-18 DIAGNOSIS — R29.6 FREQUENT FALLS: ICD-10-CM

## 2017-05-18 DIAGNOSIS — F34.1 DYSTHYMIA: ICD-10-CM

## 2017-05-18 DIAGNOSIS — J41.8 MIXED SIMPLE AND MUCOPURULENT CHRONIC BRONCHITIS (HCC): Primary | ICD-10-CM

## 2017-05-18 RX ORDER — FLUTICASONE FUROATE AND VILANTEROL 200; 25 UG/1; UG/1
1 POWDER RESPIRATORY (INHALATION) DAILY
Qty: 1 EACH | Refills: 5 | Status: SHIPPED | OUTPATIENT
Start: 2017-05-18 | End: 2017-06-07 | Stop reason: ALTCHOICE

## 2017-05-18 NOTE — MR AVS SNAPSHOT
Visit Information Date & Time Provider Department Dept. Phone Encounter #  
 5/18/2017  1:45 PM Justin Pierson, [address] [phone] [id number] Your Appointments 6/2/2017 10:00 AM  
ROUTINE CARE with Justin Pierson MD  
[facility] [address]) Appt Note: 1 MONTH F/U  
 [place] [address] [facility] [address] [facility] [address] [phone] Upcoming Health Maintenance Date Due  
 GLAUCOMA SCREENING Q2Y 6/30/2015 INFLUENZA AGE 9 TO ADULT 8/1/2017 MEDICARE YEARLY EXAM 10/12/2017 DTaP/Tdap/Td series (2 - Td) 6/11/2023 Allergies as of 5/18/2017  Review Complete On: 5/18/2017 By: Mary Jane Stone LPN Severity Noted Reaction Type Reactions Levaquin [Levofloxacin] High 04/04/2016    Other (comments) [place]'s tendonitis with rupture Pcn [Penicillins]  04/08/2010    Anaphylaxis Sulfa (Sulfonamide Antibiotics)  04/08/2010    Unknown (comments) Current Immunizations  Reviewed on 3/6/2017 Name Date Influenza High Dose Vaccine PF 10/1/2016 Pneumococcal Conjugate (PCV-13) 10/1/2016 Pneumococcal Vaccine (Unspecified Type) 5/13/2010, 1/1/2003 Tdap 6/11/2013 Zoster 1/1/2009 Not reviewed this visit You Were Diagnosed With   
  
 Codes Comments Mixed simple and mucopurulent chronic bronchitis ([facility]    -  Primary ICD-10-CM: J41.8 ICD-9-CM: 491.1 Vitals BP Pulse Temp Resp Height(growth percentile) Weight(growth percentile) 140/80 (BP 1 Location: Left arm, BP Patient Position: Sitting) 78 98 °F (36.7 °C) (Oral) 14 5' 10\" (1.778 m) 147 lb (66.7 kg) SpO2 BMI Smoking Status 98% 21.09 kg/m2 Never Smoker Vitals History BMI and BSA Data Body Mass Index Body Surface Area 21.09 kg/m 2 1.82 m 2 Preferred Pharmacy Pharmacy Name Phone [address] [address] [phone] Your Updated Medication List  
  
   
This list is accurate as of: 5/18/17  2:12 PM.  Always use your most recent med list.  
  
  
  
  
 * albuterol 90 mcg/actuation inhaler Commonly known as:  PROVENTIL HFA, VENTOLIN HFA, PROAIR HFA Take 2 Puffs by inhalation every four (4) hours as needed. * albuterol 2.5 mg /3 mL (0.083 %) nebulizer solution Commonly known as:  PROVENTIL VENTOLIN  
3 mL by Nebulization route every six (6) hours. For wheezing    **DX:J44.9**  
  
 atorvastatin 10 mg tablet Commonly known as:  LIPITOR Take 1 Tab by mouth nightly. budesonide-formoterol 160-4.5 mcg/actuation HFA inhaler Commonly known as:  SYMBICORT Take 2 Puffs by inhalation two (2) times a day. calcitonin (salmon) nasal  
Commonly known as:  MIACALCIN  
1 [place] by IntraNASal route daily. cholecalciferol 1,000 unit tablet Commonly known as:  VITAMIN D3 Take 1,000 Units by mouth daily. citalopram 20 mg tablet Commonly known as:  Nevelyn Poke Take 1 Tab by mouth daily. donepezil 10 mg tablet Commonly known as:  ARICEPT Take 1 Tab by mouth nightly. Going to [place]  
  
 fluticasone-vilanterol 200-25 mcg/dose inhaler Commonly known as:  BREO ELLIPTA Take 1 Puff by inhalation daily. HYDROcodone-acetaminophen 5-325 mg per tablet Commonly known as:  Kwame Dus One every 4 hours daytime and 2 at night for pain  
  
 lisinopril 10 mg tablet Commonly known as:  Dorean Peeks Take 10 mg by mouth daily. MUCINEX 600 mg ER tablet Generic drug:  guaiFENesin ER Take 600 mg by mouth daily as needed. ROBAXIN 500 mg tablet Generic drug:  methocarbamol Take 500 mg by mouth four (4) times daily as needed. SEROquel 25 mg tablet Generic drug:  QUEtiapine Take 12.5 mg by mouth nightly as needed (agitation). TYLENOL 325 mg tablet Generic drug:  acetaminophen Take 650 mg by mouth every four (4) hours as needed for Pain. VITAMIN B-12 2,500 mcg sublingual tablet Generic drug:  cyanocobalamin Take 2,500 mcg by mouth daily. * Notice: This list has 2 medication(s) that are the same as other medications prescribed for you. Read the directions carefully, and ask your doctor or other care provider to review them with you. Prescriptions Printed Refills  
 fluticasone-vilanterol (BREO ELLIPTA) 200-25 mcg/dose inhaler 5 Sig: Take 1 Puff by inhalation daily. Class: Print Route: Inhalation Introducing [facility] SERVICES! Dear Erin Laughter: Thank you for requesting a [employer] account. Our records indicate that you already have an active [employer] account. You can access your account anytime at [email or web address] Did you know that you can access your hospital and ER discharge instructions at any time in [employer]? You can also review all of your test results from your hospital stay or ER visit. Additional Information If you have questions, please visit the Frequently Asked Questions section of the [employer] website at [email or web address] Remember, [employer] is NOT to be used for urgent needs. For medical emergencies, dial 911. Now available from your iPhone and Android! Please provide this summary of care documentation to your next provider. Your primary care clinician is listed as  President. If you have any questions after today's visit, please call [phone].

## 2017-05-18 NOTE — PROGRESS NOTES
Chief Complaint   Patient presents with    Asthma    Fall     3 to 4 since visit on May 1st    Back Pain     Patient Active Problem List    Diagnosis    Dysthymia    Depression     Seeing DR [facility] psychiatry      Osteopenia    BRYANT on CPAP     Partially compliant        BPH (benign prostatic hyperplasia)    Personal history of colon cancer, stage III     1 positive node      chemorx for 6 months      GÓMEZ (mycobacterium avium-intracellulare) ([facility]    CAD (coronary artery disease)    COPD (chronic obstructive pulmonary disease) (HCC)     Asthma        HTN (hypertension)    Melanoma (HCC)     Poor appetitie  No nausea  Sob afraid of symbicort  Using neb only BID  cpap broken    Confused thin weight loss > 20 pounds  In 12 months  Review of Systems - General ROS: positive for  - fatigue  Psychological ROS: negative for - depression  Hematological and Lymphatic ROS: negative  Endocrine ROS: negative for - hair pattern changes, hot flashes or palpitations  Respiratory ROS: no cough, shortness of breath, or wheezing  Cardiovascular ROS: no chest pain   Gastrointestinal ROS: no abdominal pain, change in bowel habits, or black or bloody stools  Genito-Urinary ROS: no dysuria, trouble voiding, or hematuria  Musculoskeletal ROS: positive for - gait disturbance, joint pain, joint stiffness and joint swelling  Neurological ROS: no TIA or stroke symptoms  Dermatological ROS: negative for - mole changes, nail changes or skin lesion changes    Chest  Upper lobe wheeze  Not dyspneic  Walks with a cane  Legs thin muscle atrophy    Plan  Add breo sample instead of symbicort  Use neb more  Protein in diet  ? ? Stop aricept to eee if eats better  PT not helpful as memnory is very poor and cannot folloow advice    1. Mixed simple and mucopurulent chronic bronchitis ([facility]  As above add breo    2. Dysthymia  Stay on citalopram    3. Balance disorder  Pt not helping    4.  Frequent falls  If he improved nutrition may help  Protein bars daily      Prolonged visit with 15 minutes of time out  of more than a  25 minute visit spent counseling patient and family and formulation of care

## 2017-05-22 ENCOUNTER — PATIENT OUTREACH (OUTPATIENT)
Dept: INTERNAL MEDICINE CLINIC | Age: 82
End: 2017-05-22

## 2017-05-22 NOTE — PROGRESS NOTES
Patient has completed 30 day transition of care. Attended follow up [facility] appointment with Dr. Tj Dorsey on 5/1/2017. Goals in progress. Patient is unable to remember to use assistive devices correctly and work with PT d/t dementia. Discussed patient with Dr. Tj Dorsey and there are no further interventions to prevent falls at this time. Patient's wife monitors use of ADs and encourages patient to use at all times. No other needs identified to Nurse Navigator at this time. Resolving episode.

## 2017-05-24 ENCOUNTER — OFFICE VISIT (OUTPATIENT)
Dept: INTERNAL MEDICINE CLINIC | Age: 82
End: 2017-05-24

## 2017-05-24 VITALS
OXYGEN SATURATION: 92 % | TEMPERATURE: 97.4 F | SYSTOLIC BLOOD PRESSURE: 128 MMHG | HEART RATE: 84 BPM | RESPIRATION RATE: 18 BRPM | HEIGHT: 70 IN | DIASTOLIC BLOOD PRESSURE: 86 MMHG | BODY MASS INDEX: 20.79 KG/M2 | WEIGHT: 145.2 LBS

## 2017-05-24 DIAGNOSIS — J44.1 COPD WITH EXACERBATION (HCC): Primary | ICD-10-CM

## 2017-05-24 RX ORDER — PREDNISONE 20 MG/1
40 TABLET ORAL
Qty: 20 TAB | Refills: 0 | Status: SHIPPED | OUTPATIENT
Start: 2017-05-24 | End: 2017-07-10 | Stop reason: ALTCHOICE

## 2017-05-24 RX ORDER — AZITHROMYCIN 250 MG/1
TABLET, FILM COATED ORAL
Qty: 6 TAB | Refills: 0 | Status: SHIPPED | OUTPATIENT
Start: 2017-05-24 | End: 2017-05-29

## 2017-05-24 NOTE — PROGRESS NOTES
HISTORY OF PRESENT ILLNESS  Chang Mac is a [de-identified] y.o. male. HPI  Here for cough for one week. He has copd and just started breo. He has no fever but is more dyspneic. He has no nasal congestion but has sputum. Past Medical History:   Diagnosis Date    [facility] ([facility]     COPD     Depression     HTN (hypertension) 4/8/2010    Hypercholesterolemia     Ill-defined condition     Dementia - stage II    GÓMEZ (mycobacterium avium-intracellulare) ([facility] 4/8/2010    Melanoma ([facility] 4/8/2010    BRYANT on CPAP 5/9/2013    Osteopenia 11/13/2013     Current Outpatient Prescriptions   Medication Sig    azithromycin (ZITHROMAX) 250 mg tablet UAD    predniSONE (DELTASONE) 20 mg tablet Take 2 Tabs by mouth daily (with breakfast).  fluticasone-vilanterol (BREO ELLIPTA) 200-25 mcg/dose inhaler Take 1 Puff by inhalation daily.  albuterol (PROVENTIL VENTOLIN) 2.5 mg /3 mL (0.083 %) nebulizer solution 3 mL by Nebulization route every six (6) hours. For wheezing    **DX:J44.9**    HYDROcodone-acetaminophen (NORCO) 5-325 mg per tablet One every 4 hours daytime  and 2 at night for pain    calcitonin, salmon, (MIACALCIN) nasal 1 [place] by IntraNASal route daily.  methocarbamol (ROBAXIN) 500 mg tablet Take 500 mg by mouth four (4) times daily as needed.  cyanocobalamin (VITAMIN B-12) 2,500 mcg sublingual tablet Take 2,500 mcg by mouth daily.  acetaminophen (TYLENOL) 325 mg tablet Take 650 mg by mouth every four (4) hours as needed for Pain.  lisinopril (PRINIVIL, ZESTRIL) 10 mg tablet Take 10 mg by mouth daily.  QUEtiapine (SEROQUEL) 25 mg tablet Take 12.5 mg by mouth nightly as needed (agitation).  citalopram (CELEXA) 20 mg tablet Take 1 Tab by mouth daily.  atorvastatin (LIPITOR) 10 mg tablet Take 1 Tab by mouth nightly.  donepezil (ARICEPT) 10 mg tablet Take 1 Tab by mouth nightly. Going to [place]    guaiFENesin ER (MUCINEX) 600 mg ER tablet Take 600 mg by mouth daily as needed.     cholecalciferol (VITAMIN D3) 1,000 unit tablet Take 1,000 Units by mouth daily.  albuterol (PROVENTIL HFA, VENTOLIN HFA, PROAIR HFA) 90 mcg/actuation inhaler Take 2 Puffs by inhalation every four (4) hours as needed. No current facility-administered medications for this visit. Review of Systems   All other systems reviewed and are negative. Visit Vitals    /86 (BP 1 Location: Left arm)    Pulse 84    Temp 97.4 °F (36.3 °C) (Oral)    Resp 18    Ht 5' 10\" (1.778 m)    Wt 145 lb 3.2 oz (65.9 kg)    SpO2 92%    BMI 20.83 kg/m2       Physical Exam   Constitutional: He appears well-developed and well-nourished. Cardiovascular: Normal rate, regular rhythm and normal heart sounds. Pulmonary/Chest: Effort normal. No respiratory distress. He has wheezes. He has no rales. Scattered with prolonged expiration. Nursing note and vitals reviewed. ASSESSMENT and PLAN  Peng Peraza was seen today for cold symptoms. Diagnoses and all orders for this visit:    COPD with exacerbation ([facility]  -     azithromycin (ZITHROMAX) 250 mg tablet; UAD  -     predniSONE (DELTASONE) 20 mg tablet; Take 2 Tabs by mouth daily (with breakfast). Continue breo and use albuterol.       Reviewed plan of care with the patient who has provided input and agrees with the goals

## 2017-05-24 NOTE — PROGRESS NOTES
1. Have you been to the ER, urgent care clinic since your last visit? Hospitalized since your last visit?no    2. Have you seen or consulted any other health care providers outside of the [address] [place] [address] since your last visit? Include any pap smears or colon screening.  No  Chief Complaint   Patient presents with    Cold Symptoms     Not fasting

## 2017-05-24 NOTE — MR AVS SNAPSHOT
Visit Information Date & Time Provider Department Dept. Phone Encounter #  
 5/24/2017  1:30 PM Beatrice Casiano MD [facility] [phone] [id number] Your Appointments 6/2/2017 10:00 AM  
ROUTINE CARE with Kee Cooper MD  
[facility] [address]) Appt Note: 1 MONTH F/U  
 [place] [address] [facility] [address]  
[facility] [facility] [id number] Upcoming Health Maintenance Date Due  
 GLAUCOMA SCREENING Q2Y 6/30/2015 INFLUENZA AGE 9 TO ADULT 8/1/2017 MEDICARE YEARLY EXAM 10/12/2017 DTaP/Tdap/Td series (2 - Td) 6/11/2023 Allergies as of 5/24/2017  Review Complete On: 5/24/2017 By: Adriana Regalado Severity Noted Reaction Type Reactions Levaquin [Levofloxacin] High 04/04/2016    Other (comments) Vinh's tendonitis with rupture Pcn [Penicillins]  04/08/2010    Anaphylaxis Sulfa (Sulfonamide Antibiotics)  04/08/2010    Unknown (comments) Current Immunizations  Reviewed on 3/6/2017 Name Date Influenza High Dose Vaccine PF 10/1/2016 Pneumococcal Conjugate (PCV-13) 10/1/2016 Pneumococcal Vaccine (Unspecified Type) 5/13/2010, 1/1/2003 Tdap 6/11/2013 Zoster 1/1/2009 Not reviewed this visit You Were Diagnosed With   
  
 Codes Comments COPD with exacerbation ([facility].)    -  Primary ICD-10-CM: J44.1 ICD-9-CM: 491.21 Vitals BP Pulse Temp Resp Height(growth percentile) Weight(growth percentile) 128/86 (BP 1 Location: Left arm) 84 97.4 °F (36.3 °C) (Oral) 18 5' 10\" (1.778 m) 145 lb 3.2 oz (65.9 kg) SpO2 BMI Smoking Status 92% 20.83 kg/m2 Never Smoker BMI and BSA Data Body Mass Index Body Surface Area  
 20.83 kg/m 2 1.8 m 2 Preferred Pharmacy Pharmacy Name Phone [address] [place] [address] [phone] Your Updated Medication List  
  
   
This list is accurate as of: 5/24/17  1:33 PM.  Always use your most recent med list.  
  
  
  
  
 * albuterol 90 mcg/actuation inhaler Commonly known as:  PROVENTIL HFA, VENTOLIN HFA, PROAIR HFA Take 2 Puffs by inhalation every four (4) hours as needed. * albuterol 2.5 mg /3 mL (0.083 %) nebulizer solution Commonly known as:  PROVENTIL VENTOLIN  
3 mL by Nebulization route every six (6) hours. For wheezing    **DX:J44.9**  
  
 atorvastatin 10 mg tablet Commonly known as:  LIPITOR Take 1 Tab by mouth nightly. azithromycin 250 mg tablet Commonly known as:  ZITHROMAX  
UAD  
  
 calcitonin (salmon) nasal  
Commonly known as:  MIACALCIN  
1 [place] by IntraNASal route daily. cholecalciferol 1,000 unit tablet Commonly known as:  VITAMIN D3 Take 1,000 Units by mouth daily. citalopram 20 mg tablet Commonly known as:  Juliene Ferraro Take 1 Tab by mouth daily. donepezil 10 mg tablet Commonly known as:  ARICEPT Take 1 Tab by mouth nightly. Going to [place]  
  
 fluticasone-vilanterol 200-25 mcg/dose inhaler Commonly known as:  BREO ELLIPTA Take 1 Puff by inhalation daily. HYDROcodone-acetaminophen 5-325 mg per tablet Commonly known as:  Luda Hannon One every 4 hours daytime and 2 at night for pain  
  
 lisinopril 10 mg tablet Commonly known as:  Dana Barrier Take 10 mg by mouth daily. MUCINEX 600 mg ER tablet Generic drug:  guaiFENesin ER Take 600 mg by mouth daily as needed. predniSONE 20 mg tablet Commonly known as:  Mel Linden Take 2 Tabs by mouth daily (with breakfast). ROBAXIN 500 mg tablet Generic drug:  methocarbamol Take 500 mg by mouth four (4) times daily as needed. SEROquel 25 mg tablet Generic drug:  QUEtiapine Take 12.5 mg by mouth nightly as needed (agitation). TYLENOL 325 mg tablet Generic drug:  acetaminophen Take 650 mg by mouth every four (4) hours as needed for Pain. VITAMIN B-12 2,500 mcg sublingual tablet Generic drug:  cyanocobalamin Take 2,500 mcg by mouth daily. * Notice: This list has 2 medication(s) that are the same as other medications prescribed for you. Read the directions carefully, and ask your doctor or other care provider to review them with you. Prescriptions Sent to Pharmacy Refills  
 azithromycin (ZITHROMAX) 250 mg tablet 0 Sig: UAD Class: Normal  
 Pharmacy: [address] [place] [address] Ph #: [phone]  
 predniSONE (DELTASONE) 20 mg tablet 0 Sig: Take 2 Tabs by mouth daily (with breakfast). Class: Normal  
 Pharmacy: [address] [place] [address] Ph #: [phone] Route: Oral  
  
Introducing [facility] SERVICES! Dear Ronan Staff: Thank you for requesting a [employer] account. Our records indicate that you already have an active [employer] account. You can access your account anytime at [email or web address] Did you know that you can access your hospital and ER discharge instructions at any time in [employer]? You can also review all of your test results from your hospital stay or ER visit. Additional Information If you have questions, please visit the Frequently Asked Questions section of the [employer] website at [email or web address] Remember, [employer] is NOT to be used for urgent needs. For medical emergencies, dial 911. Now available from your iPhone and Android! Please provide this summary of care documentation to your next provider. Your primary care clinician is listed as Patrick Erwin. If you have any questions after today's visit, please call [phone].

## 2017-06-02 ENCOUNTER — OFFICE VISIT (OUTPATIENT)
Dept: INTERNAL MEDICINE CLINIC | Age: 82
End: 2017-06-02

## 2017-06-02 VITALS
HEART RATE: 76 BPM | WEIGHT: 149 LBS | OXYGEN SATURATION: 99 % | RESPIRATION RATE: 15 BRPM | HEIGHT: 70 IN | TEMPERATURE: 97.5 F | SYSTOLIC BLOOD PRESSURE: 161 MMHG | DIASTOLIC BLOOD PRESSURE: 85 MMHG | BODY MASS INDEX: 21.33 KG/M2

## 2017-06-02 DIAGNOSIS — J41.1 MUCOPURULENT CHRONIC BRONCHITIS (HCC): Primary | ICD-10-CM

## 2017-06-02 DIAGNOSIS — R29.6 FALLS FREQUENTLY: ICD-10-CM

## 2017-06-02 RX ORDER — CLARITHROMYCIN 500 MG/1
500 TABLET, FILM COATED ORAL 2 TIMES DAILY
Qty: 14 TAB | Refills: 0 | Status: SHIPPED | OUTPATIENT
Start: 2017-06-02 | End: 2017-07-10 | Stop reason: ALTCHOICE

## 2017-06-02 RX ORDER — PREDNISONE 20 MG/1
40 TABLET ORAL
Qty: 20 TAB | Refills: 0 | Status: SHIPPED | OUTPATIENT
Start: 2017-06-02 | End: 2017-06-07 | Stop reason: ALTCHOICE

## 2017-06-02 NOTE — MR AVS SNAPSHOT
Visit Information Date & Time Provider Department Dept. Phone Encounter #  
 6/2/2017 10:00 AM Opal Souza MD [facility] [phone] [id number] Upcoming Health Maintenance Date Due  
 GLAUCOMA SCREENING Q2Y 6/30/2015 INFLUENZA AGE 9 TO ADULT 8/1/2017 MEDICARE YEARLY EXAM 10/12/2017 DTaP/Tdap/Td series (2 - Td) 6/11/2023 Allergies as of 6/2/2017  Review Complete On: 6/2/2017 By: Ahsan Solano LPN Severity Noted Reaction Type Reactions Levaquin [Levofloxacin] High 04/04/2016    Other (comments) Vinh's tendonitis with rupture Pcn [Penicillins]  04/08/2010    Anaphylaxis Sulfa (Sulfonamide Antibiotics)  04/08/2010    Unknown (comments) Current Immunizations  Reviewed on 3/6/2017 Name Date Influenza High Dose Vaccine PF 10/1/2016 Pneumococcal Conjugate (PCV-13) 10/1/2016 Pneumococcal Vaccine (Unspecified Type) 5/13/2010, 1/1/2003 Tdap 6/11/2013 Zoster 1/1/2009 Not reviewed this visit You Were Diagnosed With   
  
 Codes Comments [facility]opur[facility] ([facility]    -  Primary ICD-10-CM: J41.1 ICD-9-CM: 491.1 Vitals BP Pulse Temp Resp Height(growth percentile) Weight(growth percentile) 161/85 (BP 1 Location: Right arm, BP Patient Position: Sitting) 76 97.5 °F (36.4 °C) (Oral) 15 5' 10\" (1.778 m) 149 lb (67.6 kg) SpO2 BMI Smoking Status 99% 21.38 kg/m2 Never Smoker Vitals History BMI and BSA Data Body Mass Index Body Surface Area  
 21.38 kg/m 2 1.83 m 2 Preferred Pharmacy Pharmacy Name Phone [address] [place] [address] [phone] Your Updated Medication List  
  
   
This list is accurate as of: 6/2/17 10:40 AM.  Always use your most recent med list.  
  
  
  
  
 * albuterol 90 mcg/actuation inhaler Commonly known as:  PROVENTIL HFA, VENTOLIN HFA, PROAIR HFA  
 Take 2 Puffs by inhalation every four (4) hours as needed. * albuterol 2.5 mg /3 mL (0.083 %) nebulizer solution Commonly known as:  PROVENTIL VENTOLIN  
3 mL by Nebulization route every six (6) hours. For wheezing    **DX:J44.9**  
  
 atorvastatin 10 mg tablet Commonly known as:  LIPITOR Take 1 Tab by mouth nightly. calcitonin (salmon) nasal  
Commonly known as:  MIACALCIN  
1 [place] by IntraNASal route daily. cholecalciferol 1,000 unit tablet Commonly known as:  VITAMIN D3 Take 1,000 Units by mouth daily. citalopram 20 mg tablet Commonly known as:  Claudeen Allegra Take 1 Tab by mouth daily. clarithromycin 500 mg tablet Commonly known as:  Vandana Estela Take 1 Tab by mouth two (2) times a day. donepezil 10 mg tablet Commonly known as:  ARICEPT Take 1 Tab by mouth nightly. Going to [place]  
  
 fluticasone-vilanterol 200-25 mcg/dose inhaler Commonly known as:  BREO ELLIPTA Take 1 Puff by inhalation daily. HYDROcodone-acetaminophen 5-325 mg per tablet Commonly known as:  Chaparrita Holiday One every 4 hours daytime and 2 at night for pain  
  
 lisinopril 10 mg tablet Commonly known as:  Aaron Bold Take 10 mg by mouth daily. MUCINEX 600 mg ER tablet Generic drug:  guaiFENesin ER Take 600 mg by mouth daily as needed. * predniSONE 20 mg tablet Commonly known as:  Christella Salts Take 2 Tabs by mouth daily (with breakfast). * predniSONE 20 mg tablet Commonly known as:  Christella Salts Take 2 Tabs by mouth daily (with breakfast). ROBAXIN 500 mg tablet Generic drug:  methocarbamol Take 500 mg by mouth four (4) times daily as needed. TYLENOL 325 mg tablet Generic drug:  acetaminophen Take 650 mg by mouth every four (4) hours as needed for Pain. VITAMIN B-12 2,500 mcg sublingual tablet Generic drug:  cyanocobalamin Take 2,500 mcg by mouth daily. * Notice:   This list has 4 medication(s) that are the same as other medications prescribed for you. Read the directions carefully, and ask your doctor or other care provider to review them with you. Prescriptions Sent to Pharmacy Refills  
 clarithromycin (BIAXIN) 500 mg tablet 0 Sig: Take 1 Tab by mouth two (2) times a day. Class: Normal  
 Pharmacy: [address] [place] [address] #: [phone] Route: Oral  
 predniSONE (DELTASONE) 20 mg tablet 0 Sig: Take 2 Tabs by mouth daily (with breakfast). Class: Normal  
 Pharmacy: [address] [place] [address] 18, [address] [place] [address] Ph #: [phone] Route: Oral  
  
Introducing [facility]! Dear Corinna Kocher: Thank you for requesting a [employer] account. Our records indicate that you already have an active [employer] account. You can access your account anytime at [email or web address] Did you know that you can access your hospital and ER discharge instructions at any time in [employer]? You can also review all of your test results from your hospital stay or ER visit. Additional Information If you have questions, please visit the Frequently Asked Questions section of the [employer] website at [email or web address] Remember, [employer] is NOT to be used for urgent needs. For medical emergencies, dial 911. Now available from your iPhone and Android! Please provide this summary of care documentation to your next provider. Your primary care clinician is listed as Hillary Pabon. If you have any questions after today's visit, please call [phone].

## 2017-06-02 NOTE — PROGRESS NOTES
Chief Complaint   Patient presents with    Fall     daily basis, cut hand during fall and hit shoulder, bruise under eye    Cough     chest cough, still taking Prednisone, finished ZPak     COPD Review:  The patient is being seen for follow up of frequent episodes of bronchitis, chronic bronchitis and COPD. Oxygen: He currently is not on home oxygen therapy. Symptoms: chronic dyspnea: severity = moderate: course of sx: symptoms have progressed to a point and plateaued. cough: severity: moderate: sputum : yellow: moderate. Patient uses 1 pillows at night. Patient does not smoke cigarettes. Took zpak and finishing   Prednisone  Vitals:    06/02/17 0958   BP: 161/85   Pulse: 76   Resp: 15   Temp: 97.5 °F (36.4 °C)   TempSrc: Oral   SpO2: 99%   Weight: 149 lb (67.6 kg)   Height: 5' 10\" (1.778 m)     Chest rhgonchi no wheeze  Frequent falls a continual problem    Advise David Chambers was seen today for fall and cough. Diagnoses and all orders for this visit:    Mucopurulent chronic bronchitis ([address]    Falls frequently    Other orders  -     clarithromycin (BIAXIN) 500 mg tablet; Take 1 Tab by mouth two (2) times a day. -     predniSONE (DELTASONE) 20 mg tablet; Take 2 Tabs by mouth daily (with breakfast).       Use prednisone for  Future use

## 2017-06-07 ENCOUNTER — OFFICE VISIT (OUTPATIENT)
Dept: INTERNAL MEDICINE CLINIC | Age: 82
End: 2017-06-07

## 2017-06-07 VITALS
WEIGHT: 149 LBS | HEART RATE: 82 BPM | OXYGEN SATURATION: 98 % | DIASTOLIC BLOOD PRESSURE: 77 MMHG | SYSTOLIC BLOOD PRESSURE: 110 MMHG | BODY MASS INDEX: 21.33 KG/M2 | RESPIRATION RATE: 16 BRPM | HEIGHT: 70 IN

## 2017-06-07 DIAGNOSIS — G30.0 EARLY ONSET ALZHEIMER'S DEMENTIA WITHOUT BEHAVIORAL DISTURBANCE (HCC): Primary | ICD-10-CM

## 2017-06-07 DIAGNOSIS — R29.6 MULTIPLE FALLS: ICD-10-CM

## 2017-06-07 DIAGNOSIS — I10 ESSENTIAL HYPERTENSION: ICD-10-CM

## 2017-06-07 DIAGNOSIS — F02.80 EARLY ONSET ALZHEIMER'S DEMENTIA WITHOUT BEHAVIORAL DISTURBANCE (HCC): Primary | ICD-10-CM

## 2017-06-07 RX ORDER — BUDESONIDE AND FORMOTEROL FUMARATE DIHYDRATE 160; 4.5 UG/1; UG/1
2 AEROSOL RESPIRATORY (INHALATION) 2 TIMES DAILY
Qty: 2 INHALER | Refills: 0
Start: 2017-06-07 | End: 2017-12-13 | Stop reason: SDUPTHER

## 2017-06-07 NOTE — PATIENT INSTRUCTIONS
Preventing falls (The Basics)Written by the doctors and editors at [facility]       Am I at risk of falling?     -- Your risk of falling increases as you grow older. Thats because getting older can make it harder to walk steadily and keep your balance. Also, the effects of falls are more serious in older people. Overall, 3 to 4 out of every 10 people over the age of 72 fall each year. Up to 76 percent of people who fracture a hip never recover to the point they were before they had their fracture. If you have fallen in the past, you are at higher risk of falling again. Several things can increase your risk of a fall, including:  Illness   A change in the medicines you take   An unsafe or unfamiliar setting (for example, a room with rugs or furniture that might trip you, or an area you dont know well)    How can my doctor help me to avoid falling?     -- Your doctor can talk to you about the following things:  Past falls - It is important to tell your doctor about any times you have fallen or almost fallen. He or she can then suggest ways to prevent another fall. Your health conditions - Some health problems can put you at risk of falling. These include conditions that affect eyesight, hearing, muscle strength, or balance. The medicines you take - Certain medicines can increase the risk of falling. These include some medicines that are used for sleeping problems, anxiety, or depression. Adding new medicines, or changing doses of some medicines, can also affect your risk of falling. The more your doctor knows about your situation, the better he or she will be able to help you. For example, if you fell because you have a condition that causes pain, your doctor might suggest treatments to deal with the pain. Or if 1 of your medicines is making you dizzy and more likely to fall, your doctor might switch you to a different medicine. Is there anything I can do on my own?     -- Yes.  To help keep from falling, you can:  Make your home safer - To avoid falling at home, get rid of things that might make you trip or slip. This might include furniture, electrical cords, clutter, and loose rugs. Keep your home well lit so that you can easily see where you are going. Avoid storing things in high places so you dont have to reach or climb. Wear sturdy shoes that fit well - Wearing shoes with high heels or slippery soles, or shoes that are too loose can lead to falls. Walking around in bare feet, or only socks, can also increase your risk of falling. Take vitamin D pills - Taking vitamin D might lower the risk of falls in older people. This is because vitamin D helps make bones and muscles stronger. Your doctor can help you decide how much vitamin D to take. Stay active - Exercising on a regular basis can help lower your risk of falling. It is best to do a few different activities that help with both strength and balance. There are many kinds of exercise that can be safe for older people. These include walking, swimming, and David Chi (a [employer] martial art that involves slow, gentle movements). Use a cane, walker, and other safety devices - If your doctor recommends that you use a cane or walker, be sure that its the right size and you know how to use it. There are other devices that might help you avoid falling, too. These include grab bars or a sturdy seat for the shower, and hand rails or treads for the stairs (to prevent slipping). If you worry that you could fall, there are also alarm buttons that let you call for help if you fall and cant get up. What should I do if I fall?     -- If you fall, see your doctor right away, even if you arent hurt. Your doctor can try to figure out what caused you to fall, and how likely you are to fall again. He or she will do an exam and talk to you about your health problems, medicines, and activities.  Then he or she can suggest things you can do to avoid falling again. Many older people have a hard time recovering after a fall. Doing things to prevent falling can help you to protect your health and independence. Muscle Conditioning: Exercises  Your Care Instructions  Here are some examples of exercises for muscle conditioning. Start each exercise slowly. Ease off the exercise if you start to have pain. Your doctor or physical therapist will tell you when you can start these exercises and which ones will work best for you. How to do the exercises  Wall push-ups    1. Stand facing a wall, about 12 to 18 inches away. 2. Place your hands on the wall at shoulder height. 3. Slowly bend your elbows and bring your face toward the wall, moving your hips and shoulders forward together. 4. Push slowly back to the starting position. 5. Start with 5 repetitions and work up to 8 to 12. 6. Rest for a minute, and repeat the exercise. Note: When you can do this exercise against a wall comfortably (without your muscles feeling tired), you can try it against a counter. Start with 5 repetitions again and work up to 8 to 12. You can then slowly progress to the end of a couch or a sturdy chair, and finally to the floor. Knee extension    1. While sitting in a chair, straighten one leg and hold while you slowly count to 5. Be sure you do not lock your knee. 2. Repeat 8 to 12 times. 3. Rest for a minute, and repeat the exercise. 4. Do the same exercise with the other leg. Note: If this exercise becomes easy, you can add a light weight around your ankle or tie an elastic resistance band to a chair leg and one ankle. Side-lying leg lift    1. Lie on your side, with your legs extended. Keep your hips straight up and down during this exercise. Do not let your top hip rock toward the back. Support your head with your hand, and place the other hand on the floor near your waist.  2. Slowly raise your upper leg until it is about in line with your shoulder.  Keep your toes pointed forward. 3. Slowly lower your leg to the starting position. 4. Repeat 8 to 12 times. 5. Rest for a minute, and repeat the exercise. 6. Turn to your other side and do the same exercise with your other leg. Note: If this exercise becomes easy, you can add a light weight around your ankle or tie an elastic resistance band to both ankles. Shallow standing knee bends    1. Stand with your hands lightly resting on a counter or chair in front of you with your feet shoulder-width apart. 2. Slowly bend your knees so that you squat down just like you were going to sit in a chair. Make sure your knees do not go in front of your toes. 3. Lower yourself about 6 inches. Your heels should remain on the floor at all times. 4. Rise slowly to a standing position. 5. Repeat 8 to 12 times. 6. Rest for a minute, and repeat the exercise. Follow-up care is a key part of your treatment and safety. Be sure to make and go to all appointments, and call your doctor if you are having problems. It's also a good idea to know your test results and keep a list of the medicines you take.

## 2017-06-07 NOTE — PROGRESS NOTES
Chief Complaint   Patient presents with    Medication Evaluation     go over all medicine, pt feeling dizzy    Fall     3 or 4 since last visit      Wife worries parkinsons disease    Subjective:  He continues to have severe gait problems with balance issues and frequent falls. His wife brought him in today because he wanted to see me about it. They brought all of his medications, wondering if any of his meds could be causing it. He had a history of prior hypertension, although we've titrated his blood pressure meds down and his blood pressure seems to remain normal.  He does get lightheaded. He says first thing in the morning he has a harder time with his balance. He doesn't have spasticity or stiffness. He just gets confused and he forgets that his balance is poor. He basically never uses the walker. When he uses a cane he carries it rather than uses it. His dementia is pretty severe. [facility]'s been [facility] by [facility]. He's never seen [facility], he's only seen the nurse practitioner. I've not received any communications. He was seen by a neurologist in [place] this winter in [facility], who felt he had classic Alzheimer's type dementia with decline. No one's ever mentioned Parkinson's disease. His wife has been reading and she wondered if he could have Parkinson's because of his balance issues. Physical Examination:  His exam shows his blood pressure to be normal.  His heart rhythm is regular. He has normal facial features. He has maybe slight increased tone in his upper extremities. No increased tone in his lower extremities. No resting tremor. His gait is not shuffling. He just seems to be inattentive and he does look down as he walks. If he turns to the side he stumbles. He does have a bad Achilles tendon related to Achilles tendon tear last year. Plan:  1. I don't think he has Parkinson's.   I suggested discontinuing the Lisinopril because that can make him lightheaded. 2. I suggested exercise programs, Tyrone Lee.  He's getting physical therapy twice a week. 3. I told him to discuss it with his neurologist whether he could have possibly Parkinson's. My opinion is that he doesn't but I did tell the wife that Parkinson's is a hard diagnosis and oftentimes it's under-diagnosed or misdiagnosed, so I did suggest she get in to see Dr. Marisol Cano, although she tells me she never sees Dr. Marisol Cano, only sees the nurse practitioner. I  suggested she insist on seeing the neurologist and to skip the nurse practitioner part of it. Vitals:    06/07/17 1342 06/07/17 1402   BP: 125/88 110/77   Pulse: 82    Resp: 16    SpO2: 98%    Weight: 149 lb (67.6 kg)    Height: 5' 10\" (1.778 m)      [facility] Staff was seen today for medication evaluation and fall. Diagnoses and all orders for this visit:    Early onset Alzheimer's dementia without behavioral disturbance    Multiple falls    Essential hypertension    Other orders  -     budesonide-formoterol (SYMBICORT) 160-4.5 mcg/actuation HFA inhaler; Take 2 Puffs by inhalation two (2) times a day.       Monitor home bP

## 2017-06-12 ENCOUNTER — HOSPITAL ENCOUNTER (OUTPATIENT)
Dept: GENERAL RADIOLOGY | Age: 82
Discharge: HOME | End: 2017-06-12
Attending: PHYSICIAN ASSISTANT
Payer: MEDICARE

## 2017-06-12 ENCOUNTER — TELEPHONE (OUTPATIENT)
Dept: INTERNAL MEDICINE CLINIC | Age: 82
End: 2017-06-12

## 2017-06-12 ENCOUNTER — OFFICE VISIT (OUTPATIENT)
Dept: INTERNAL MEDICINE CLINIC | Age: 82
End: 2017-06-12

## 2017-06-12 VITALS
RESPIRATION RATE: 20 BRPM | BODY MASS INDEX: 21.47 KG/M2 | WEIGHT: 150 LBS | HEIGHT: 70 IN | OXYGEN SATURATION: 97 % | HEART RATE: 76 BPM | TEMPERATURE: 97.9 F | DIASTOLIC BLOOD PRESSURE: 81 MMHG | SYSTOLIC BLOOD PRESSURE: 128 MMHG

## 2017-06-12 DIAGNOSIS — M25.512 LEFT SHOULDER PAIN, UNSPECIFIED CHRONICITY: ICD-10-CM

## 2017-06-12 DIAGNOSIS — R05.9 COUGH: Primary | ICD-10-CM

## 2017-06-12 DIAGNOSIS — J44.9 CHRONIC OBSTRUCTIVE PULMONARY DISEASE, UNSPECIFIED COPD TYPE (HCC): ICD-10-CM

## 2017-06-12 DIAGNOSIS — R05.9 COUGH: ICD-10-CM

## 2017-06-12 PROCEDURE — 71020 XR CHEST PA LAT: CPT

## 2017-06-12 PROCEDURE — 73030 X-RAY EXAM OF SHOULDER: CPT

## 2017-06-12 NOTE — MR AVS SNAPSHOT
Visit Information Date & Time Provider Department Dept. Phone Encounter #  
 6/12/2017  1:20 PM Cristian Stanley PA-C Count includes the [facility] [phone] [id number] Upcoming Health Maintenance Date Due  
 GLAUCOMA SCREENING Q2Y 6/30/2015 INFLUENZA AGE 9 TO ADULT 8/1/2017 MEDICARE YEARLY EXAM 10/12/2017 DTaP/Tdap/Td series (2 - Td) 6/11/2023 Allergies as of 6/12/2017  Review Complete On: 6/12/2017 By: Cristian Stanley PA-C Severity Noted Reaction Type Reactions Levaquin [Levofloxacin] High 04/04/2016    Other (comments) Fork's tendonitis with rupture Ciprofloxacin Medium   Other (comments)  
 tendonitis Pcn [Penicillins]  04/08/2010    Anaphylaxis Sulfa (Sulfonamide Antibiotics)  04/08/2010    Unknown (comments) Current Immunizations  Reviewed on 3/6/2017 Name Date Influenza High Dose Vaccine PF 10/1/2016 Pneumococcal Conjugate (PCV-13) 10/1/2016 Pneumococcal Vaccine (Unspecified Type) 5/13/2010, 1/1/2003 Tdap 6/11/2013 Zoster 1/1/2009 Not reviewed this visit You Were Diagnosed With   
  
 Codes Comments Cough    -  Primary ICD-10-CM: L42 ICD-9-CM: 491. 2 Chronic obstructive pulmonary disease, unspecified COPD type ([facility]     ICD-10-CM: J44.9 ICD-9-CM: 915 Left shoulder pain, unspecified chronicity     ICD-10-CM: M25.512 ICD-9-CM: 719.41 Vitals BP Pulse Temp Resp Height(growth percentile) Weight(growth percentile) 128/81 76 97.9 °F (36.6 °C) (Oral) 20 5' 10\" (1.778 m) 150 lb (68 kg) SpO2 BMI Smoking Status 97% 21.52 kg/m2 Never Smoker BMI and BSA Data Body Mass Index Body Surface Area  
 21.52 kg/m 2 1.83 m 2 Preferred Pharmacy Pharmacy Name Phone [address] [address] [phone] Your Updated Medication List  
  
   
This list is accurate as of: 6/12/17  2:08 PM.  Always use your most recent med list.  
 * albuterol 90 mcg/actuation inhaler Commonly known as:  PROVENTIL HFA, VENTOLIN HFA, PROAIR HFA Take 2 Puffs by inhalation every four (4) hours as needed. * albuterol 2.5 mg /3 mL (0.083 %) nebulizer solution Commonly known as:  PROVENTIL VENTOLIN  
3 mL by Nebulization route every six (6) hours. For wheezing    **DX:J44.9**  
  
 atorvastatin 10 mg tablet Commonly known as:  LIPITOR Take 1 Tab by mouth nightly. budesonide-formoterol 160-4.5 mcg/actuation HFA inhaler Commonly known as:  SYMBICORT Take 2 Puffs by inhalation two (2) times a day. calcitonin (salmon) nasal  
Commonly known as:  MIACALCIN  
1 [place] by IntraNASal route daily. cholecalciferol 1,000 unit tablet Commonly known as:  VITAMIN D3 Take 1,000 Units by mouth daily. citalopram 20 mg tablet Commonly known as:  Halley Common Take 1 Tab by mouth daily. clarithromycin 500 mg tablet Commonly known as:  Vedia Backbone Take 1 Tab by mouth two (2) times a day. donepezil 10 mg tablet Commonly known as:  ARICEPT Take 1 Tab by mouth nightly. Going to [place] HYDROcodone-acetaminophen 5-325 mg per tablet Commonly known as:  Elyn Barley One every 4 hours daytime and 2 at night for pain MUCINEX 600 mg ER tablet Generic drug:  guaiFENesin ER Take 600 mg by mouth daily as needed. predniSONE 20 mg tablet Commonly known as:  Izora Nas Take 2 Tabs by mouth daily (with breakfast). ROBAXIN 500 mg tablet Generic drug:  methocarbamol Take 500 mg by mouth four (4) times daily as needed. TYLENOL 325 mg tablet Generic drug:  acetaminophen Take 650 mg by mouth every four (4) hours as needed for Pain. VITAMIN B-12 2,500 mcg sublingual tablet Generic drug:  cyanocobalamin Take 2,500 mcg by mouth daily. * Notice: This list has 2 medication(s) that are the same as other medications prescribed for you.  Read the directions carefully, and ask your doctor or other care provider to review them with you. To-Do List   
 06/12/2017 Imaging:  XR CHEST PA LAT   
  
 06/12/2017 Imaging:  XR SHOULDER LT AP/LAT MIN 2 V Introducing \[facility]\"" & HEALTH SERVICES! Dear Leandro Reid: Thank you for requesting a [employer] account. Our records indicate that you already have an active [employer] account. You can access your account anytime at [email or web address] Did you know that you can access your hospital and ER discharge instructions at any time in [employer]? You can also review all of your test results from your hospital stay or ER visit. Additional Information If you have questions, please visit the Frequently Asked Questions section of the [employer] website at [email or web address] Remember, [employer] is NOT to be used for urgent needs. For medical emergencies, dial 911. Now available from your iPhone and Android! Please provide this summary of care documentation to your next provider. Your primary care clinician is listed as Gabby Hines. If you have any questions after today's visit, please call [phone].

## 2017-06-12 NOTE — PROGRESS NOTES
HISTORY OF PRESENT ILLNESS  Nanette Andrade is a [de-identified] y.o. male. HPI  Patient presents to the office today for evaluation of \"feeling poorly for the past month\". He states he has thick clear mucous. He is here today with his wife and she reports he has a little wheeze and mucus that is thick. He has been on two different antibiotics and prednisone in the past but the mucous has not changed. He denies fever or chills. He denies other cold symptoms. He has been taking his inhaler and mucinex. He reports he fell on his arm a few weeks back. He has been having increase pain with ROM and certain ways he sleeps. Review of Systems   Constitutional: Negative for chills and fever. Musculoskeletal: Positive for joint pain. Left shoulder pain. Blood pressure 128/81, pulse 76, temperature 97.9 °F (36.6 °C), temperature source Oral, resp. rate 20, height 5' 10\" (1.778 m), weight 150 lb (68 kg), SpO2 97 %. Physical Exam   Constitutional: No distress. Cardiovascular: Normal rate and regular rhythm. Pulmonary/Chest: He has wheezes. Rhonchi heard. Musculoskeletal: He exhibits no tenderness. Left shoulder no current tenderness. No increase pain with ROM. ASSESSMENT and PLAN  May Morrison was seen today for cold symptoms. Diagnoses and all orders for this visit:    Cough  -     XR CHEST PA LAT; Future    Chronic obstructive pulmonary disease, unspecified COPD type ([facility]  -     XR CHEST PA LAT; Future    Left shoulder pain, unspecified chronicity  -     XR SHOULDER LT AP/LAT MIN 2 V; Future    I explained to the patient and his wife that Dr. Tj Dorsey did not give antibiotics again last time because he probably felt it was not needed. I did advised them to start the prednisone that was given to them the last time by Dr. Tj Dorsey. He is to continue the mucinex and get a xray. I will contact them with the results of the xray once back.

## 2017-06-12 NOTE — PROGRESS NOTES
Please let the patient know no pneumonia seen. It appears to be emphysema.   Also the radiologist made note of mild T12 vertebral compression and fracture of left seventh and eighth ribs thanks

## 2017-06-12 NOTE — PROGRESS NOTES
Writer spoke with patient's wife (Matthew Shaffer) and informed of both xray's done per Lauro Castro verbalized understanding.

## 2017-06-12 NOTE — PROGRESS NOTES
Writer spoke with patient's wife (Katlyn Fam) and informed of both xray's done per Judit Barajas verbalized understanding. Maggie Kaur would like to know if she should put her  on the prednisone that she has on hand for patient per Dr. Alycia Carr?

## 2017-07-10 ENCOUNTER — TELEPHONE (OUTPATIENT)
Dept: INTERNAL MEDICINE CLINIC | Age: 82
End: 2017-07-10

## 2017-07-10 ENCOUNTER — OFFICE VISIT (OUTPATIENT)
Dept: INTERNAL MEDICINE CLINIC | Age: 82
End: 2017-07-10

## 2017-07-10 VITALS
RESPIRATION RATE: 16 BRPM | BODY MASS INDEX: 21.33 KG/M2 | HEIGHT: 70 IN | WEIGHT: 149 LBS | OXYGEN SATURATION: 95 % | SYSTOLIC BLOOD PRESSURE: 158 MMHG | DIASTOLIC BLOOD PRESSURE: 93 MMHG | TEMPERATURE: 97.9 F | HEART RATE: 84 BPM

## 2017-07-10 DIAGNOSIS — S22.31XA CLOSED FRACTURE OF RIB OF RIGHT SIDE, INITIAL ENCOUNTER: Primary | ICD-10-CM

## 2017-07-10 DIAGNOSIS — H83.2X3 VESTIBULAR DISEQUILIBRIUM, BILATERAL: ICD-10-CM

## 2017-07-10 RX ORDER — OXYCODONE HYDROCHLORIDE 5 MG/1
5 TABLET ORAL
Qty: 20 TAB | Refills: 0 | Status: SHIPPED | OUTPATIENT
Start: 2017-07-10 | End: 2017-08-17 | Stop reason: ALTCHOICE

## 2017-07-10 RX ORDER — OXYCODONE HYDROCHLORIDE 5 MG/1
TABLET ORAL
COMMUNITY
Start: 2017-07-06 | End: 2017-07-10 | Stop reason: SDUPTHER

## 2017-07-10 NOTE — TELEPHONE ENCOUNTER
Pts wife Torin  requesting an appointment today or tomorrow for a hospital f/up. He went to [facility] on 7/6/17 for broken ribs. Ms Olive Gastelum phone number is [phone].              From answering service    Made an appt,however no one answered.

## 2017-07-11 NOTE — PROGRESS NOTES
Chief Complaint   Patient presents with   [facility] Follow Up     ER on 7/6/17, broken rib #9 and chest trauma, oxycodone 5 mg q4 hours, tylenol prn. 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 white male with dementia and mild cognitive decline, serious balance issues, and underlying COPD. He gets lightheaded easily. He occasionally has spinning. He denies ringing in his ears. He had a fall in his bathroom about five days ago, hit his right rib cage, had a posterior right ninth rib fracture. They gave him 20 Oxycodone. He had been taking the pain medicine and feeling a little better. He wonders if he should continue to take something. It still hurts. He has tried a little Tylenol and Advil, and they do not help as well. His BP is normal off med. He is on no drugs that will lower his blood pressure. His extraocular motions are intact. When he laid flat I could not trigger nystagmus nor could I trigger with head motion any evidence of vertigo. His Clinton-Hallpike maneuver was normal.  Heel-to-shin testing normal.  Balance is poor. He has general vestibular balance issues of the elderly. Dementia makes it hard. I have asked him to get a grab bar in his bathroom, and to try to get his bathroom at home as safe as possible. I have given him 20 additional Oxycodone for pain, then he will transition to weaker pain medicine. I have given him physical therapy for vestibular work. There is supposedly a PT that will come to his house, who is a trained vestibular therapist.  I told him this cannot hurt, and it may help. He will try his best.  Return to see me in a month.     Patient Active Problem List    Diagnosis    Dysthymia    Depression     Seeing DR eubanks [facility] psychiatry      Osteopenia    BRYANT on CPAP     Partially compliant        BPH (benign prostatic hyperplasia)    Personal history of colon cancer, stage III     1 positive node      chemorx for 6 months      GÓMEZ (mycobacterium avium-intracellulare) ([facility]    CAD (coronary artery disease)    COPD (chronic obstructive pulmonary disease) (HCC)     Asthma        HTN (hypertension)    Melanoma ([facility]     Vitals:    07/10/17 1323   BP: (!) 158/93   Pulse: 84   Resp: 16   Temp: 97.9 °F (36.6 °C)   TempSrc: Oral   SpO2: 95%   Weight: 149 lb (67.6 kg)   Height: 5' 10\" (1.778 m)     Kirsten Brown was seen today for hospital follow up and dizziness. Diagnoses and all orders for this visit:    Closed fracture of rib of right side, initial encounter    Vestibular disequilibrium, bilateral  -     REFERRAL TO PHYSICAL THERAPY    Other orders  -     oxyCODONE IR (ROXICODONE) 5 mg immediate release tablet; Take 1 Tab by mouth every six (6) hours as needed for Pain. Max Daily Amount: 20 mg.

## 2017-07-20 ENCOUNTER — PATIENT OUTREACH (OUTPATIENT)
Dept: INTERNAL MEDICINE CLINIC | Age: 82
End: 2017-07-20

## 2017-07-20 NOTE — PROGRESS NOTES
Patient is listed on daily census Passport report for admission to [facility] 7/16-7/19 for dizziness and inability to get out of bed. PLAN: Discharged to [facility] for rehab. No discharge summary available at this time. Compared discharge medication list with [facility] Rec. Discrepancies include lisinopril 2.5 mg daily, Seroquel 12.5 mg QHS PRN, and Robaxin 500 mg QID which were all discontinued by Dr. Matt Santiago within the past couple of months. Discussed with Dr. Matt Santiago and he is in agreement with this NN contacting [facility] and notifying them of discontinuation. Leslie Pappas, nurse at [facility]. Introduced self and NN role. Advised her of medications that were discontinued by Dr. Matt Santiago and she verified on patient's medication list that these were not ordered. Reviewed all other medications that he is currently prescribed at [facility]. Advised her that he was taking Aricept, atorvastatin, and oxycodone prn PTA. She will discuss with the rehab provider to re-order these medications. Reports he has not c/o any pain thus far. Plan to follow up with the SW at [facility] in the next few weeks to discuss plan of care.

## 2017-07-28 ENCOUNTER — PATIENT OUTREACH (OUTPATIENT)
Dept: INTERNAL MEDICINE CLINIC | Age: 82
End: 2017-07-28

## 2017-07-28 NOTE — PROGRESS NOTES
Contacted [facility]. Kim Ventura is out until next Wednesday, so Obi Mckeon is covering. [facility] requesting a return call to discuss patient's status and discharge plans. Received return call from Gabino Thomas stating that the patient is not progressing as fast as he did during his last stay there. He seems to be confused. He is minimal assist with ambulation. He may be discharged in 2 weeks. Plan to contact Gabino Thomas in a couple of weeks for an update.

## 2017-08-14 ENCOUNTER — PATIENT OUTREACH (OUTPATIENT)
Dept: INTERNAL MEDICINE CLINIC | Age: 82
End: 2017-08-14

## 2017-08-17 ENCOUNTER — PATIENT OUTREACH (OUTPATIENT)
Dept: INTERNAL MEDICINE CLINIC | Age: 82
End: 2017-08-17

## 2017-08-17 RX ORDER — ALBUTEROL SULFATE 90 UG/1
2 AEROSOL, METERED RESPIRATORY (INHALATION)
Qty: 1 INHALER | Refills: 11 | Status: SHIPPED | OUTPATIENT
Start: 2017-08-17 | End: 2017-12-13 | Stop reason: SDUPTHER

## 2017-08-17 NOTE — PROGRESS NOTES
Patient was admitted to [facility] 7/16-7/19 for dizziness and inability to get out of bed. Discharged to [facility] 7/19-8/16. Contacted patient's wife [facility] [place], listed on HIPAA, for ANTONIO follow up. She reports her  is doing well so far. [facility] PT and SN are scheduled to visit today and PT arrived during the call. His previous PT was a vestibular specialist, but unfortunately the patient did not see any improvement in symptoms. Patient is no longer on Oxycodin; c/o pain at times in his back and shoulder and uses pain cream and Tylenol. Reviewed medications. No changes were made. Requesting refill on albuterol-sent request to Dr. Clementina Mead. Patient has appointment with neurologist Dr. Mitch Pat on 10/30. Scheduled ANTONIO appointment with Dr. Clementina Mead next Tuesday 8/22 at 10:15 am. [facility] [place] denies any questions or concerns at this time.

## 2017-08-22 ENCOUNTER — PATIENT OUTREACH (OUTPATIENT)
Dept: INTERNAL MEDICINE CLINIC | Age: 82
End: 2017-08-22

## 2017-08-22 ENCOUNTER — OFFICE VISIT (OUTPATIENT)
Dept: INTERNAL MEDICINE CLINIC | Age: 82
End: 2017-08-22

## 2017-08-22 VITALS
OXYGEN SATURATION: 98 % | HEART RATE: 70 BPM | SYSTOLIC BLOOD PRESSURE: 142 MMHG | DIASTOLIC BLOOD PRESSURE: 70 MMHG | HEIGHT: 70 IN | RESPIRATION RATE: 16 BRPM | WEIGHT: 144 LBS | BODY MASS INDEX: 20.62 KG/M2

## 2017-08-22 DIAGNOSIS — F02.818 LATE ONSET ALZHEIMER'S DISEASE WITH BEHAVIORAL DISTURBANCE (HCC): Primary | ICD-10-CM

## 2017-08-22 DIAGNOSIS — R46.89 WANDERING: ICD-10-CM

## 2017-08-22 DIAGNOSIS — G30.1 LATE ONSET ALZHEIMER'S DISEASE WITH BEHAVIORAL DISTURBANCE (HCC): Primary | ICD-10-CM

## 2017-08-22 DIAGNOSIS — F05 SUNDOWNING: ICD-10-CM

## 2017-08-22 DIAGNOSIS — J44.9 CHRONIC OBSTRUCTIVE PULMONARY DISEASE, UNSPECIFIED COPD TYPE (HCC): ICD-10-CM

## 2017-08-22 RX ORDER — QUETIAPINE FUMARATE 25 MG/1
12.5 TABLET, FILM COATED ORAL
Qty: 30 TAB | Refills: 2 | Status: SHIPPED | OUTPATIENT
Start: 2017-08-22 | End: 2017-10-02 | Stop reason: SDUPTHER

## 2017-08-22 NOTE — PROGRESS NOTES
Met with patient and his wife today in the office for ANTONIO follow up. The patient is ambulating with a cane. Working with PT/OT but is not sure if he has noticed any improvement. Denies any further falls since discharge home. Dizziness worse per patient. Per wife, patient wandering at night and confused, so Dr. Jourdan Mullen ordered Seroquel today. Plan to follow up with patient/wife at 30 day post discharge to assess for any further needs.

## 2017-08-22 NOTE — MR AVS SNAPSHOT
Visit Information Date & Time Provider Department Dept. Phone Encounter #  
 8/22/2017 10:15 AM Jia Hampton MD [facility] [phone] [id number] Upcoming Health Maintenance Date Due  
 GLAUCOMA SCREENING Q2Y 6/30/2015 INFLUENZA AGE 9 TO ADULT 8/1/2017 MEDICARE YEARLY EXAM 10/12/2017 DTaP/Tdap/Td series (2 - Td) 6/11/2023 Allergies as of 8/22/2017  Review Complete On: 8/22/2017 By: Layla Rome LPN Severity Noted Reaction Type Reactions Levaquin [Levofloxacin] High 04/04/2016    Other (comments) Vinh's tendonitis with rupture Ciprofloxacin Medium   Other (comments)  
 tendonitis Pcn [Penicillins]  04/08/2010    Anaphylaxis Sulfa (Sulfonamide Antibiotics)  04/08/2010    Unknown (comments) Current Immunizations  Reviewed on 3/6/2017 Name Date Influenza High Dose Vaccine PF 10/1/2016 Pneumococcal Conjugate (PCV-13) 10/1/2016 Tdap 6/11/2013 ZZZ-RETIRED (DO NOT USE) Pneumococcal Vaccine (Unspecified Type) 5/13/2010, 1/1/2003 Zoster 1/1/2009 Not reviewed this visit You Were Diagnosed With   
  
 Codes Comments Late onset Alzheimer's disease with behavioral disturbance    -  Primary ICD-10-CM: G30.1, F02.81 ICD-9-CM: 331.0, 294.11 Wandering     ICD-10-CM: Z91.83 ICD-9-CM: V40.31 Chronic obstructive pulmonary disease, unspecified COPD type ([facility]     ICD-10-CM: J44.9 ICD-9-CM: 398 Sundowning     ICD-10-CM: F05 ICD-9-CM: [id number] Vitals BP Pulse Resp Height(growth percentile) Weight(growth percentile) SpO2  
 142/70 70 16 5' 10\" (1.778 m) 144 lb (65.3 kg) 98% BMI Smoking Status 20.66 kg/m2 Never Smoker Vitals History BMI and BSA Data Body Mass Index Body Surface Area  
 20.66 kg/m 2 1.8 m 2 Preferred Pharmacy Pharmacy Name Phone [address] [place] [address] [phone] Your Updated Medication List  
  
   
This list is accurate as of: 8/22/17 11:13 AM.  Always use your most recent med list.  
  
  
  
  
 * albuterol 2.5 mg /3 mL (0.083 %) nebulizer solution Commonly known as:  PROVENTIL VENTOLIN  
3 mL by Nebulization route every six (6) hours. For wheezing    **DX:J44.9** * albuterol 90 mcg/actuation inhaler Commonly known as:  PROVENTIL HFA, VENTOLIN HFA, PROAIR HFA Take 2 Puffs by inhalation every four (4) hours as needed. atorvastatin 10 mg tablet Commonly known as:  LIPITOR Take 1 Tab by mouth nightly. budesonide-formoterol 160-4.5 mcg/actuation HFA inhaler Commonly known as:  SYMBICORT Take 2 Puffs by inhalation two (2) times a day. cholecalciferol 1,000 unit tablet Commonly known as:  VITAMIN D3 Take 1,000 Units by mouth daily. citalopram 20 mg tablet Commonly known as:  Jerl Bulla Take 1 Tab by mouth daily. donepezil 10 mg tablet Commonly known as:  ARICEPT Take 1 Tab by mouth nightly. Going to [place] QUEtiapine 25 mg tablet Commonly known as:  SEROquel Take 0.5 Tabs by mouth nightly. TYLENOL 325 mg tablet Generic drug:  acetaminophen Take 650 mg by mouth every four (4) hours as needed for Pain. VITAMIN B-12 2,500 mcg sublingual tablet Generic drug:  cyanocobalamin Take 2,500 mcg by mouth daily. * Notice: This list has 2 medication(s) that are the same as other medications prescribed for you. Read the directions carefully, and ask your doctor or other care provider to review them with you. Prescriptions Sent to Pharmacy Refills QUEtiapine (SEROQUEL) 25 mg tablet 2 Sig: Take 0.5 Tabs by mouth nightly. Class: Normal  
 Pharmacy: [address] [place] [address] 18, [address] #: [phone] Route: Oral  
  
Introducing [facility] & HEALTH SERVICES! Dear Kristi Chavarria: Thank you for requesting a [email or web address] account.   Our records indicate that you already have an active [employer] account. You can access your account anytime at [email or web address] Did you know that you can access your hospital and ER discharge instructions at any time in [employer]? You can also review all of your test results from your hospital stay or ER visit. Additional Information If you have questions, please visit the Frequently Asked Questions section of the [employer] website at [email or web address] Remember, [employer] is NOT to be used for urgent needs. For medical emergencies, dial 911. Now available from your iPhone and Android! Please provide this summary of care documentation to your next provider. Your primary care clinician is listed as Kwame Porter. If you have any questions after today's visit, please call [phone].

## 2017-08-22 NOTE — PROGRESS NOTES
Coordination of Care Questions    1. Have you been to the ER, urgent care clinic since your last visit? No       Hospitalized since your last visit? No    2. Have you seen or consulted any other health care providers outside of the [address] [place] [address] since your last visit? Include any pap smears or colon screening.  No

## 2017-08-22 NOTE — PROGRESS NOTES
Problem follow up:  Gerda Manjarrez returns for follow up visit regarding hosp stay and rehad.  he was seen 6 days ago in rehab diagnosed with vertigo and treated with PT OT. Workup was significant for not seen. Notes, labs, studies, imaging related to this problem during prior visit were available . Since that visit, he has improved. he has been compliant with prescribed treatment. Residual symptoms include: balance ataxia confusion sundowning wandering in apartment  New issues associated with this problem include: . Contacted patient's wife Rowena Rothman, listed on HIPAA, for ANTONIO follow up. She reports her  is doing well so far. [facility] PT and SN are scheduled to visit today and PT arrived during the call. His previous PT was a vestibular specialist, but unfortunately the patient did not see any improvement in symptoms. Patient is no longer on Oxycodin; c/o pain at times in his back and shoulder and uses pain cream and Tylenol.      Reviewed medications. No changes were made. Requesting refill on albuterol-sent request to Dr. Rosa Isela Barajas.   Mr. Gerda Manjarrez is a [de-identified]y.o. year old male, he is seen today for Transition of Care services following a hospital discharge for ataxia on 9/16. Our office Nurse Navigator performed an outreach to Mr. Gustavo Feldman on 8/17 (within 2 business days of discharge) to complete medication reconciliation and a telephonic assessment of his condition.       Patient Active Problem List    Diagnosis    Dysthymia    Depression     Seeing DR Carrington [facility] psychiatry      Osteopenia    BRYANT on CPAP     Partially compliant        BPH (benign prostatic hyperplasia)    Personal history of colon cancer, stage III     1 positive node      chemorx for 6 months      GÓMEZ (mycobacterium avium-intracellulare) ([facility]    CAD (coronary artery disease)    COPD (chronic obstructive pulmonary disease) (HCC)     Asthma        HTN (hypertension)    Melanoma ([facility]     Lungs clear      Vitals:    08/22/17 1029 08/22/17 1059   BP: 157/89 142/70   Pulse: 70    Resp: 16    SpO2: 98%    Weight: 144 lb (65.3 kg)    Height: 5' 10\" (1.778 m)      Alert confused repeats  Chest clear  Rhythm reg  Neuro non focal  Gait with cane fair        PT and nurse seeing at home  One suggested meclizine      Diagnoses and all orders for this visit:    1. Late onset Alzheimer's disease with behavioral disturbance    2. Wandering    3. Chronic obstructive pulmonary disease, unspecified COPD type ([facility]    4. Sundowning    Other orders  -     QUEtiapine (SEROQUEL) 25 mg tablet; Take 0.5 Tabs by mouth nightly.       Try  seroquel ,..black box warning reviwed    2 month follow    No meclizine

## 2017-08-29 ENCOUNTER — TELEPHONE (OUTPATIENT)
Dept: INTERNAL MEDICINE CLINIC | Age: 82
End: 2017-08-29

## 2017-08-29 NOTE — TELEPHONE ENCOUNTER
Spoke with Micky Sweeney (PT) from [facility]. He has been trying to teach the patient vertigo exercises unsuccessfully. He was in the home and witnessed the patient have  a ground level fall today with a left shoulder bruise. He is concerned that the patient is \"going to fall and kill himself\". Please advise. He can be reached at [phone].

## 2017-08-31 NOTE — TELEPHONE ENCOUNTER
Notified Lolly Melvin per Dr Mahamed Bates that he agrees that he really needs to wear hip protectors and use a walker at all times. He states understanding and that typically the patient does not hit his hips when falling. He believes that he needs to be placed in a facility for his own safety where he can have 24 hour care.

## 2017-09-05 DIAGNOSIS — I10 ESSENTIAL HYPERTENSION: ICD-10-CM

## 2017-09-05 RX ORDER — ATORVASTATIN CALCIUM 10 MG/1
10 TABLET, FILM COATED ORAL
Qty: 90 TAB | Refills: 0 | Status: SHIPPED | OUTPATIENT
Start: 2017-09-05 | End: 2017-12-04

## 2017-09-15 ENCOUNTER — PATIENT OUTREACH (OUTPATIENT)
Dept: INTERNAL MEDICINE CLINIC | Age: 82
End: 2017-09-15

## 2017-09-15 DIAGNOSIS — R26.89 BALANCE PROBLEM: ICD-10-CM

## 2017-09-15 DIAGNOSIS — R29.6 FREQUENT FALLS: Primary | ICD-10-CM

## 2017-09-15 NOTE — PROGRESS NOTES
Received VMM from patient's wife stating that [facility] PT has discharged patient as there is nothing further they can do for him. She would like to take him to outpatient PT at [address] [place] [address]. Discussed patient with Dr. Richardean Simmonds and he gave verbal order for this NN to order PT. Attempted to contact Mrs. Young to notify her of this information. Unable to reach. [facility] requesting a return call. 9/18: Contacted [facility] and verified fax number for all outpatient PT referrals. Faxed referral order and demographic sheet and received fax confirmation. Contacted patient's wife Subhash Dixon and notified her of this information and that [facility] should contact her to schedule appointment. Provided her with their phone number. She verbalized understanding.

## 2017-09-19 NOTE — PROGRESS NOTES
9/19: Received a VMM from patient's wife that [facility] has not received the referral for outpatient PT. Contacted [facility] and verified that they have not received the order. Re-faxed the order and contacted them again. Verified that they have received it and the patient can come in at 10 am. Cristina Isaac and notified her of this information. She is actually at this office currently to  the order for PT. Met with Sanjay Esteves and provided her with the referral and demographic sheet. She also requested recommendations for an ENT for her . Discussed with Dr. Obed Blas and provided her with the contact information for Dr. Jeanna Stafford and Dr. Dipak Flower. She also reports her  went to the ER this morning around 4 am d/t a fall out of bed and he had to get stitches. She is planning to take him to PT this morning. Will access [facility] records and provide report to Dr. Obed Blas for review. Will confirm when stitches need to be removed and contact his wife to schedule appt when needed.

## 2017-09-20 ENCOUNTER — PATIENT OUTREACH (OUTPATIENT)
Dept: INTERNAL MEDICINE CLINIC | Age: 82
End: 2017-09-20

## 2017-09-20 NOTE — PROGRESS NOTES
Patient is listed on daily census [employer] report for visit to [facility] ER 9/19 for fall out of bed with head laceration. Sutures placed. Instructed to have them removed in 5-7 days. Patient's wife Twyla Dean was in the office yesterday to obtain order for outpatient PT and met with this NN. Bruce Blevins today to schedule appt to have sutures removed, but she states the ER told her she could bring him back Sunday to the ER to have them removed, so so she will do that. Went to PT yesterday, but was unable to stay for one hour eval d/t feeling ill from the fall and had a lot of shoulder pain. Today he is feeling much better. Rescheduled appointment with PT and will work with balance specialist. No further needs at this time.

## 2017-09-26 ENCOUNTER — CLINICAL SUPPORT (OUTPATIENT)
Dept: INTERNAL MEDICINE CLINIC | Age: 82
End: 2017-09-26

## 2017-09-26 DIAGNOSIS — Z23 ENCOUNTER FOR IMMUNIZATION: ICD-10-CM

## 2017-10-02 RX ORDER — QUETIAPINE FUMARATE 25 MG/1
25 TABLET, FILM COATED ORAL
Qty: 30 TAB | Refills: 5 | Status: SHIPPED | OUTPATIENT
Start: 2017-10-02 | End: 2017-12-04 | Stop reason: ALTCHOICE

## 2017-10-02 NOTE — TELEPHONE ENCOUNTER
Patients wife states  she has had to give the pt 1 tab instead of the recommended 0.5 dosage,states pharmacy states it is to early for another refill for this medication

## 2017-10-09 DIAGNOSIS — F34.1 DYSTHYMIA: ICD-10-CM

## 2017-10-09 RX ORDER — CITALOPRAM 20 MG/1
20 TABLET, FILM COATED ORAL DAILY
Qty: 90 TAB | Refills: 1 | Status: SHIPPED | OUTPATIENT
Start: 2017-10-09 | End: 2017-12-13 | Stop reason: SDUPTHER

## 2017-10-09 NOTE — TELEPHONE ENCOUNTER
Pt is requesting a refill for Cloxapen 20 mg to be called to the pharm on file.  Best contact [phone].              From answering service

## 2017-10-18 ENCOUNTER — PATIENT OUTREACH (OUTPATIENT)
Dept: INTERNAL MEDICINE CLINIC | Age: 82
End: 2017-10-18

## 2017-10-18 NOTE — PROGRESS NOTES
Patient was in [address] [place] [address] ED 10/17 s/p mechanical fall. No injuries noted. Imaging negative. Discharged home with Rx for Lortab 7.5-325 mg 1-2 tab Q4-6 hours prn. Contacted patient's wife Lina Robles for follow up. She reports patient is doing better today. Scheduled 2 month f/u with Dr. Christen Christianson next Tuesday 10/24 at 3 pm. She is out for a walk at the moment, so unable to complete further follow up. Plan to meet with patient and his wife at upcoming appointment.

## 2017-10-24 ENCOUNTER — PATIENT OUTREACH (OUTPATIENT)
Dept: INTERNAL MEDICINE CLINIC | Age: 82
End: 2017-10-24

## 2017-10-24 ENCOUNTER — OFFICE VISIT (OUTPATIENT)
Dept: INTERNAL MEDICINE CLINIC | Age: 82
End: 2017-10-24

## 2017-10-24 VITALS
DIASTOLIC BLOOD PRESSURE: 93 MMHG | OXYGEN SATURATION: 95 % | WEIGHT: 148 LBS | HEART RATE: 64 BPM | SYSTOLIC BLOOD PRESSURE: 147 MMHG | TEMPERATURE: 98 F | BODY MASS INDEX: 21.19 KG/M2 | HEIGHT: 70 IN

## 2017-10-24 DIAGNOSIS — F02.818 ALZHEIMER'S DISEASE OF OTHER ONSET WITH BEHAVIORAL DISTURBANCE: Primary | ICD-10-CM

## 2017-10-24 DIAGNOSIS — R29.6 MULTIPLE FALLS: ICD-10-CM

## 2017-10-24 DIAGNOSIS — G30.8 ALZHEIMER'S DISEASE OF OTHER ONSET WITH BEHAVIORAL DISTURBANCE: Primary | ICD-10-CM

## 2017-10-24 DIAGNOSIS — J42 CHRONIC BRONCHITIS, UNSPECIFIED CHRONIC BRONCHITIS TYPE (HCC): ICD-10-CM

## 2017-10-24 NOTE — MR AVS SNAPSHOT
Visit Information Date & Time Provider Department Dept. Phone Encounter #  
 10/24/2017  3:00 PM Barrera Cason MD On license of [facility] [phone] [id number] Your Appointments 1/9/2018  3:00 PM  
ROUTINE CARE with Barrera Cason MD  
On license of [facility]) Appt Note: 2 month f/u  
 [place] [address] [place] [id number]  
[facility] [address] [address] Upcoming Health Maintenance Date Due  
 GLAUCOMA SCREENING Q2Y 6/30/2015 MEDICARE YEARLY EXAM 10/12/2017 DTaP/Tdap/Td series (2 - Td) 6/11/2023 Allergies as of 10/24/2017  Review Complete On: 10/24/2017 By: Nancy Scott LPN Severity Noted Reaction Type Reactions Levaquin [Levofloxacin] High 04/04/2016    Other (comments) Spencer's tendonitis with rupture Ciprofloxacin Medium   Other (comments)  
 tendonitis Pcn [Penicillins]  04/08/2010    Anaphylaxis Sulfa (Sulfonamide Antibiotics)  04/08/2010    Unknown (comments) Current Immunizations  Reviewed on 10/24/2017 Name Date Influenza High Dose Vaccine PF 9/26/2017, 10/1/2016 Pneumococcal Conjugate (PCV-13) 10/1/2016 Tdap 6/11/2013 ZZZ-RETIRED (DO NOT USE) Pneumococcal Vaccine (Unspecified Type) 5/13/2010, 1/1/2003 Zoster 1/1/2009 Reviewed by Barrera Cason MD on 10/24/2017 at  3:30 PM  
Vitals BP Pulse Temp Height(growth percentile) Weight(growth percentile) SpO2  
 (!) 147/93 (BP 1 Location: Right arm, BP Patient Position: Sitting) 64 98 °F (36.7 °C) (Oral) 5' 10\" (1.778 m) 148 lb (67.1 kg) 95% BMI Smoking Status 21.24 kg/m2 Never Smoker Vitals History BMI and BSA Data Body Mass Index Body Surface Area  
 21.24 kg/m 2 1.82 m 2 Preferred Pharmacy Pharmacy Name Phone [address] [place] [address] [phone] Your Updated Medication List  
  
   
This list is accurate as of: 10/24/17  3:36 PM.  Always use your most recent med list.  
  
  
  
  
 * albuterol 2.5 mg /3 mL (0.083 %) nebulizer solution Commonly known as:  PROVENTIL VENTOLIN  
3 mL by Nebulization route every six (6) hours. For wheezing    **DX:J44.9** * albuterol 90 mcg/actuation inhaler Commonly known as:  PROVENTIL HFA, VENTOLIN HFA, PROAIR HFA Take 2 Puffs by inhalation every four (4) hours as needed. atorvastatin 10 mg tablet Commonly known as:  LIPITOR Take 1 Tab by mouth nightly. budesonide-formoterol 160-4.5 mcg/actuation Hfaa Commonly known as:  SYMBICORT Take 2 Puffs by inhalation two (2) times a day. cholecalciferol 1,000 unit tablet Commonly known as:  VITAMIN D3 Take 1,000 Units by mouth daily. citalopram 20 mg tablet Commonly known as:  Nilam Sol Take 1 Tab by mouth daily. donepezil 10 mg tablet Commonly known as:  ARICEPT Take 1 Tab by mouth nightly. Going to [place] QUEtiapine 25 mg tablet Commonly known as:  SEROquel Take 1 Tab by mouth nightly. TYLENOL 325 mg tablet Generic drug:  acetaminophen Take 650 mg by mouth every four (4) hours as needed for Pain. * Notice: This list has 2 medication(s) that are the same as other medications prescribed for you. Read the directions carefully, and ask your doctor or other care provider to review them with you. Introducing [facility]! Dear Twan Hartley: Thank you for requesting a Wakie/Budist account. Our records indicate that you already have an active Wakie/Budist account. You can access your account anytime at [email or web address] Did you know that you can access your hospital and ER discharge instructions at any time in Wakie/Budist? You can also review all of your test results from your hospital stay or ER visit. Additional Information If you have questions, please visit the Frequently Asked Questions section of the PharmRight [employer] website at [email or web address] Remember, StyleTrek is NOT to be used for urgent needs. For medical emergencies, dial 911. Now available from your iPhone and Android! Please provide this summary of care documentation to your next provider. Your primary care clinician is listed as Dora Hurtado. If you have any questions after today's visit, please call [phone].

## 2017-10-24 NOTE — PROGRESS NOTES
Coordination of Care Questions    1. Have you been to the ER, urgent care clinic since your last visit? No       Hospitalized since your last visit? No    2. Have you seen or consulted any other health care providers outside of the [address] [place] [address] since your last visit? Include any pap smears or colon screening.  No

## 2017-10-25 NOTE — PROGRESS NOTES
10/24: Met with patient and wife in the office today. Patient has bruise to L ear. Reports L eye looks better. Reports using walker \"most of the time\". Encouraged him to use walker at all times d/t frequent falls and potential for injuries. Patient reports his balance does not seem to be getting any better and is actually worse. Getting outpatient PT through [address] [place] [address]. Dr. Coty Ramirez does not believe there is anything further he can do-patient's AD prevents him from being able to follow all safety precautions.

## 2017-11-16 ENCOUNTER — PATIENT OUTREACH (OUTPATIENT)
Dept: INTERNAL MEDICINE CLINIC | Age: 82
End: 2017-11-16

## 2017-11-16 NOTE — PROGRESS NOTES
Patient has completed 30 day transition of care. Attended follow up ALEXIS CORTESS appointment with Dr. Parminder Menezes on 10/24. Unfortunately, there is nothing further for Dr. Parminder Menezes to recommend d/t patient's AD and inability to follow instructions. No other needs identified to Nurse Navigator at this time. Resolving ANTONIO episode.

## 2017-12-04 ENCOUNTER — PATIENT OUTREACH (OUTPATIENT)
Dept: INTERNAL MEDICINE CLINIC | Age: 82
End: 2017-12-04

## 2017-12-04 RX ORDER — ALPRAZOLAM 0.5 MG/1
0.5 TABLET ORAL
COMMUNITY
End: 2017-12-11 | Stop reason: ALTCHOICE

## 2017-12-04 RX ORDER — DICLOFENAC SODIUM 10 MG/G
GEL TOPICAL
COMMUNITY
End: 2017-12-13 | Stop reason: SDUPTHER

## 2017-12-04 RX ORDER — AMLODIPINE BESYLATE 5 MG/1
5 TABLET ORAL DAILY
COMMUNITY
End: 2017-12-13 | Stop reason: SDUPTHER

## 2017-12-04 RX ORDER — ACETAMINOPHEN 500 MG
500 TABLET ORAL
COMMUNITY
End: 2017-12-11 | Stop reason: DRUGHIGH

## 2017-12-04 NOTE — PROGRESS NOTES
Patient was admitted to [facility] 11/26-11/30 s/p fall at home with R olecranon fracture and underwent ORIF. Patient also had accelerated HTN and was started on amlodipine. PLAN: Discharged to [facility] Instructed to f/u with surgeon Dr. Armstrong in 2 wks. Patient has RUE splint and will use a platform walker. NEW MEDICATIONS: amlodipine, diclofenac gel  DISCONTINUED MEDICATIONS: atorvastatin  Seroquel not listed on d/c med list    Contacted patient's wife and verified that patient was discharged to [facility] and will then reside in [facility], as it has become too burdensome for her to manage him at home d/t frequent falls. The patient fell on her foot prior to the hospitalization and she has had pain and difficulty walking, so she is awaiting x-rays to determine if her foot has been fractured. She dropped off paperwork this morning for Dr. Daniel Nicholas to fill out for the [facility] and would like a call with an update once it has been completed. Denies any other needs at this time. Alex Ramos, nurse at [facility]. Notified her that patient was taking Seroquel 25 mg nightly prior to admission and suggested discussing this with the facility provider to determine if patient should be on this medication. She made note of this information. Verbal order from Dr. Daniel Nicholas to d/c Seroquel from [facility]. She will fax current medication list to this NN. Received medication list from [facility] and updated [facility] in chart.

## 2017-12-05 NOTE — PROGRESS NOTES
12/5: Discussed [facility] paperwork with Dr. New De Souza and he states that it is standard procedure for the nursing facility to fill out the paperwork. Attempted to contact patient's wife. Unable to reach. Left detailed VMM notifying her to get in touch with the SW/CM at the SNF and have them assist with filling out paperwork. Requested a return call re: whether or not the patient will continue to see Dr. New De Souza or the [facility] provider once he is discharged there. Plan to f/u with SW at [facility] in a few weeks to obtain an update.

## 2017-12-08 ENCOUNTER — TELEPHONE (OUTPATIENT)
Dept: INTERNAL MEDICINE CLINIC | Age: 82
End: 2017-12-08

## 2017-12-08 NOTE — TELEPHONE ENCOUNTER
Tj Linder called to have paperwork picked up for [facility]. Paperwork was dropped off at the beginning of the week. Please give pt a call back. Also calling about paperwork for in home therapy.  Pt stated that fax number was already proved

## 2017-12-11 ENCOUNTER — PATIENT OUTREACH (OUTPATIENT)
Dept: INTERNAL MEDICINE CLINIC | Age: 82
End: 2017-12-11

## 2017-12-11 ENCOUNTER — TELEPHONE (OUTPATIENT)
Dept: INTERNAL MEDICINE CLINIC | Age: 82
End: 2017-12-11

## 2017-12-11 RX ORDER — ACETAMINOPHEN 325 MG/1
650 TABLET ORAL 3 TIMES DAILY
COMMUNITY
End: 2017-12-13 | Stop reason: SDUPTHER

## 2017-12-11 RX ORDER — ACETAMINOPHEN 500 MG
500 TABLET ORAL
COMMUNITY
End: 2017-12-13 | Stop reason: SDUPTHER

## 2017-12-11 NOTE — TELEPHONE ENCOUNTER
Home health nurse is calling in regards to drug interaction between sertalitran(sp) and namvarick.  Please call nurse back

## 2017-12-11 NOTE — PROGRESS NOTES
Patient was admitted to [facility] 11/26-11/30 s/p fall at home with R olecranon fracture and underwent ORIF. Patient also had accelerated HTN and was started on amlodipine. He was discharged to [facility] 11/30-12/8 for rehab.    -Contacted [facility] and verified that patient was discharged Friday 12/8. Contacted patient's wife and verified that patient is now at [facility], at least for a while so that he can get therapy and get stronger, but he may be there long term. He followed up with the surgeon Dr. Kymberly Joy Thursday 12/7 and stitches were removed; he wants therapy to have patient use his elbow as much as possible. Follow up again with surgeon 12/19. Patient is working with [facility] PT, still very unsteady and using the regular walker, they advised not to bring his rollator until he gets stronger. Received DME paperwork for [facility], which needs to be filled out by [facility], per Dr. Lyndsay Rinaldi. Contacted [facility] and faxed paperwork to them. Verified with [facility] & [address] that she forwarded paperwork to appropriate [facility] staff. MsAdria Price is aware of medication addition of amlodipine. The [facility] is managing patient's medications and she has a meeting with them today to finalize arrangements. [facility] provided pre-packaged medications and the nurse delivers them to the patient each day. Bren Cortes, nurse at the [facility], and received med list via fax. Updated Med Rec, Xanax not on their list, so d/c this med per verbal order from Dr. Lyndsay Rinaldi. Per [facility] & [address], there is a potential level 2 interaction between citalopram and Namzaric. Verified with Dr. Lyndsay Rinaldi that pt should continue both medications and notified Armando Aggarwal. A prior Iraj Lee is needed for Namzaric, received paperwork from Uriel Crespo and faxed to neurology Dr. Abel Allred office per Dr. Lyndsay Rinaldi instructions. Notified Armando Aggarwal of this as well.     Scheduled ANTONIO appointment with Dr. Lyndsay Rinaldi this Wed 12/13 at 11:30 am. Plan to meet with patient and wife at that time. Goals Addressed             Most Recent     Patient will work with PT to increase strength/balance/mobility (pt-stated)   On track (12/11/2017)             12/12/17: Patient will work with PT at [facility]. Currently very unsteady and using a walker. DME paperwork for obtain a WC has been faxed to At [address] [place] [address] to complete and send to [facility] [employer]  [facility]

## 2017-12-11 NOTE — TELEPHONE ENCOUNTER
Obtained current medication list from [facility] and verified that patient is not taking sertraline and he is still taking citalopram. Left detailed M notifying [facility] of this information. Encouraged her to contact this NN directly for any further questions or concerns.

## 2017-12-13 ENCOUNTER — OFFICE VISIT (OUTPATIENT)
Dept: INTERNAL MEDICINE CLINIC | Age: 82
End: 2017-12-13

## 2017-12-13 ENCOUNTER — PATIENT OUTREACH (OUTPATIENT)
Dept: INTERNAL MEDICINE CLINIC | Age: 82
End: 2017-12-13

## 2017-12-13 VITALS
DIASTOLIC BLOOD PRESSURE: 79 MMHG | TEMPERATURE: 98.5 F | RESPIRATION RATE: 18 BRPM | SYSTOLIC BLOOD PRESSURE: 153 MMHG | HEART RATE: 62 BPM | HEIGHT: 70 IN | OXYGEN SATURATION: 96 %

## 2017-12-13 DIAGNOSIS — G30.0 EARLY ONSET ALZHEIMER'S DEMENTIA WITHOUT BEHAVIORAL DISTURBANCE (HCC): ICD-10-CM

## 2017-12-13 DIAGNOSIS — Z00.00 MEDICARE ANNUAL WELLNESS VISIT, SUBSEQUENT: Primary | ICD-10-CM

## 2017-12-13 DIAGNOSIS — F02.80 EARLY ONSET ALZHEIMER'S DEMENTIA WITHOUT BEHAVIORAL DISTURBANCE (HCC): ICD-10-CM

## 2017-12-13 DIAGNOSIS — J44.9 CHRONIC OBSTRUCTIVE PULMONARY DISEASE, UNSPECIFIED COPD TYPE (HCC): ICD-10-CM

## 2017-12-13 DIAGNOSIS — F34.1 DYSTHYMIA: ICD-10-CM

## 2017-12-13 DIAGNOSIS — S42.401B OPEN FRACTURE OF RIGHT ELBOW, INITIAL ENCOUNTER: ICD-10-CM

## 2017-12-13 DIAGNOSIS — I10 ESSENTIAL HYPERTENSION: ICD-10-CM

## 2017-12-13 RX ORDER — AMLODIPINE BESYLATE 5 MG/1
5 TABLET ORAL DAILY
Qty: 30 TAB | Refills: 5 | Status: SHIPPED | OUTPATIENT
Start: 2017-12-13 | End: 2018-02-26 | Stop reason: SINTOL

## 2017-12-13 RX ORDER — MELATONIN
1000 DAILY
Qty: 30 TAB | Refills: 5 | Status: SHIPPED | OUTPATIENT
Start: 2017-12-13

## 2017-12-13 RX ORDER — CITALOPRAM 20 MG/1
20 TABLET, FILM COATED ORAL DAILY
Qty: 30 TAB | Refills: 5 | Status: SHIPPED | OUTPATIENT
Start: 2017-12-13 | End: 2018-01-01 | Stop reason: SDUPTHER

## 2017-12-13 RX ORDER — ALBUTEROL SULFATE 0.83 MG/ML
2.5 SOLUTION RESPIRATORY (INHALATION)
Qty: 1 PACKAGE | Refills: 5 | Status: SHIPPED | OUTPATIENT
Start: 2017-12-13

## 2017-12-13 RX ORDER — ALBUTEROL SULFATE 90 UG/1
2 AEROSOL, METERED RESPIRATORY (INHALATION)
Qty: 1 INHALER | Refills: 11 | Status: SHIPPED | OUTPATIENT
Start: 2017-12-13

## 2017-12-13 RX ORDER — GUAIFENESIN 600 MG/1
600 TABLET, EXTENDED RELEASE ORAL DAILY
Qty: 30 TAB | Refills: 5 | Status: SHIPPED | OUTPATIENT
Start: 2017-12-13 | End: 2018-01-10

## 2017-12-13 RX ORDER — DICLOFENAC SODIUM 10 MG/G
GEL TOPICAL
Qty: 1 EACH | Refills: 5 | Status: SHIPPED | OUTPATIENT
Start: 2017-12-13 | End: 2018-01-10

## 2017-12-13 RX ORDER — ACETAMINOPHEN 500 MG
500 TABLET ORAL
Qty: 30 TAB | Refills: 5 | Status: SHIPPED | OUTPATIENT
Start: 2017-12-13 | End: 2017-12-19 | Stop reason: SDUPTHER

## 2017-12-13 RX ORDER — ACETAMINOPHEN 325 MG/1
650 TABLET ORAL 3 TIMES DAILY
Qty: 90 TAB | Refills: 3 | Status: SHIPPED | OUTPATIENT
Start: 2017-12-13 | End: 2017-12-19 | Stop reason: ALTCHOICE

## 2017-12-13 RX ORDER — QUETIAPINE FUMARATE 25 MG/1
25 TABLET, FILM COATED ORAL
Qty: 30 TAB | Refills: 5 | Status: SHIPPED | OUTPATIENT
Start: 2017-12-13 | End: 2018-01-10 | Stop reason: ALTCHOICE

## 2017-12-13 RX ORDER — BUDESONIDE AND FORMOTEROL FUMARATE DIHYDRATE 160; 4.5 UG/1; UG/1
2 AEROSOL RESPIRATORY (INHALATION) 2 TIMES DAILY
Qty: 1 INHALER | Refills: 5 | Status: SHIPPED | OUTPATIENT
Start: 2017-12-13

## 2017-12-13 NOTE — PROGRESS NOTES
This is a Subsequent Medicare Annual Wellness Exam (AWV) (Performed 12 months after IPPE or effective date of Medicare Part B enrollment)    I have reviewed the patient's medical history in detail and updated the computerized patient record. History     Past Medical History:   Diagnosis Date    [facility]     COPD     Depression     HTN (hypertension) 4/8/2010    Hypercholesterolemia     Ill-defined condition     Dementia - stage II    GÓMEZ (mycobacterium avium-intracellulare) ([facility] 4/8/2010    Melanoma ([facility] 4/8/2010    BRYANT on CPAP 5/9/2013    Osteopenia 11/13/2013      Past Surgical History:   Procedure Laterality Date    ENDOSCOPY, COLON, DIAGNOSTIC      HX ABDOMINAL WALL DEFECT REPAIR  2011    colon cancer    HX CHOLECYSTECTOMY      HX COLONOSCOPY      HX ORTHOPAEDIC       Current Outpatient Prescriptions   Medication Sig Dispense Refill    QUEtiapine (SEROQUEL) 25 mg tablet Take 1 Tab by mouth nightly. 30 Tab 5    Inulin-Sorbitol (FIBER) 2 gram chew Take 2 g by mouth daily. 30 Tab 5    guaiFENesin ER (MUCINEX) 600 mg ER tablet Take 1 Tab by mouth daily. 30 Tab 5    acetaminophen (TYLENOL) 325 mg tablet Take 2 Tabs by mouth three (3) times daily. 90 Tab 3    acetaminophen (TYLENOL) 500 mg tablet Take 1 Tab by mouth daily as needed for Pain. 30 Tab 5    albuterol (PROVENTIL HFA, VENTOLIN HFA, PROAIR HFA) 90 mcg/actuation inhaler Take 2 Puffs by inhalation every four (4) hours as needed. 1 Inhaler 11    albuterol (PROVENTIL VENTOLIN) 2.5 mg /3 mL (0.083 %) nebulizer solution 3 mL by Nebulization route every four (4) hours as needed. For wheezing    **DX:J44.9** 1 Package 5    amLODIPine (NORVASC) 5 mg tablet Take 1 Tab by mouth daily. 30 Tab 5    budesonide-formoterol (SYMBICORT) 160-4.5 mcg/actuation HFAA Take 2 Puffs by inhalation two (2) times a day. 1 Inhaler 5    cholecalciferol (VITAMIN D3) 1,000 unit tablet Take 1 Tab by mouth daily.  30 Tab 5    citalopram (CELEXA) 20 mg tablet Take 1 Tab by mouth daily. 30 Tab 5    diclofenac (VOLTAREN) 1 % gel Apply  to affected area four (4) times daily as needed. 1 Each 5    memantine-donepezil 28-10 mg CSpX Take 1 Cap by mouth nightly. Allergies   Allergen Reactions    Levaquin [Levofloxacin] Other (comments)     [place]'s tendonitis with rupture    Ciprofloxacin Other (comments)     tendonitis    Pcn [Penicillins] Anaphylaxis    Sulfa (Sulfonamide Antibiotics) Unknown (comments)     Family History   Problem Relation Age of Onset   Esperanza Sun Arthritis-rheumatoid Mother     Cancer Mother     Stroke Mother      Social History   Substance Use Topics    Smoking status: Never Smoker    Smokeless tobacco: Never Used    Alcohol use 0.0 oz/week     0 Standard drinks or equivalent per week      Comment: rarely     Patient Active Problem List   Diagnosis Code    GÓMEZ (mycobacterium avium-intracellulare) [facility] A31.0    CAD (coronary artery disease) I25.10    COPD (chronic obstructive pulmonary disease) ([facility]) J44.9    HTN (hypertension) I10    Melanoma [facility] C43.9    Personal history of colon cancer, stage III Z85.038    BPH (benign prostatic hyperplasia) N40.0    BRYANT on CPAP G47.33, Z99.89    Osteopenia M85.80    Depression F32.9    Dysthymia F34.1       Depression Risk Factor Screening:     PHQ over the last two weeks 4/3/2014   PHQ Not Done Patient Decline   Little interest or pleasure in doing things Not at all   Feeling down, depressed or hopeless Not at all   Total Score PHQ 2 0     Alcohol Risk Factor Screening: You do not drink alcohol or very rarely. Functional Ability and Level of Safety:   Hearing Loss  Hearing is good. Activities of Daily Living  The home contains: [employer] bars  Patient needs help with:  preparing meals, laundry, housework, managing medications, managing money, bathing, hygiene and walking    Fall RiskFall Risk Assessment, last 12 mths 6/12/2017   Able to walk? Yes   Fall in past 12 months?  Yes Fall with injury? No   Number of falls in past 12 months 2   Fall Risk Score 2       Abuse Screen  Patient is not abused    Cognitive Screening   Evaluation of Cognitive Function:  Has your family/caregiver stated any concerns about your memory: yes  abnormal    Patient Care Team   Patient Care Team:  Jayashree Alvarado MD as PCP - General  Abigail Pimentel MD (Pulmonary Disease)  Danilo Downing RN as Ambulatory Care Navigator (Internal Medicine)  Olga Watts MD (Orthopedic Surgery)  Renaldo Johnson MD (Otolaryngology)  Katja Smith MD (Neurology)    Assessment/Plan   Education and counseling provided:  Are appropriate based on today's review and evaluation  End-of-Life planning (with patient's consent)    Diagnoses and all orders for this visit:    1. Medicare annual wellness visit, subsequent      Health Maintenance Due   Topic Date Due    GLAUCOMA SCREENING Q2Y  06/30/2015    MEDICARE YEARLY EXAM  10/12/2017     Mr. Matt Ayoub is a [de-identified]y.o. year old male, he is seen today for Transition of Care services following a hospital discharge for fracture right arm on 12/8. Our office Nurse Navigator performed an outreach to Mr. Yoko Santiago on 12/11 (within 2 business days of discharge) to complete medication reconciliation and a telephonic assessment of his condition. Patient was admitted to [facility] 11/26-11/30 s/p fall at home with R olecranon fracture and underwent ORIF. Patient also had accelerated HTN and was started on amlodipine. He was discharged to [facility] 11/30-12/8 for rehab.     -Contacted [facility] and verified that patient was discharged Friday 12/8. Contacted patient's wife and verified that patient is now at [facility], at least for a while so that he can get therapy and get stronger, but he may be there long term.  He followed up with the surgeon Dr. Corey Mckeon Thursday 12/7 and stitches were removed; he wants therapy to have patient use his elbow as much as possible. Follow up again with surgeon 12/19.     Patient is working with [facility] PT, still very unsteady and using the regular walker, they advised not to bring his rollator until he gets stronger. Received DME paperwork for [facility], which needs to be filled out by Chang Marroquin, per Dr. Tracey Marroquin. Contacted [facility] and faxed paperwork to them. Verified with [facility] & [address] that she forwarded paperwork to appropriate [place] staff.     Vitals:    12/13/17 1155   BP: 153/79   Pulse: 62   Resp: 18   Temp: 98.5 °F (36.9 °C)   TempSrc: Oral   SpO2: 96%   Height: 5' 10\" (1.778 m)     Chest clear  right foirearm wound clean warm and swollen still  Wheelchair bound for the most part  1. Medicare annual wellness visit, subsequent  Eats high risk    2. Chronic obstructive pulmonary disease, unspecified COPD type ([address]  stable    3. Essential hypertension  Restarted in rehab    4. Early onset Alzheimer's dementia without behavioral disturbance  onlo one dose at  seroquel  Black box warnings reviewed    5.  Open fracture of right elbow, initial encounter  Seeing ortho next week

## 2017-12-13 NOTE — PATIENT INSTRUCTIONS

## 2017-12-13 NOTE — MR AVS SNAPSHOT
Visit Information Date & Time Provider Department Dept. Phone Encounter #  
 12/13/2017 11:30 AM Chiquis Isidro MD [facility] [phone] [id number] Your Appointments 1/9/2018  3:00 PM  
ROUTINE CARE with Chiquis Isidro MD  
[facility]) Appt Note: 2 month f/u  
 [place] [address] Critical access hospital [address]  
[address] [facility] [address] Upcoming Health Maintenance Date Due  
 GLAUCOMA SCREENING Q2Y 6/30/2015 MEDICARE YEARLY EXAM 10/12/2017 DTaP/Tdap/Td series (2 - Td) 6/11/2023 Allergies as of 12/13/2017  Review Complete On: 12/13/2017 By: Cresencio Whiting LPN Severity Noted Reaction Type Reactions Levaquin [Levofloxacin] High 04/04/2016    Other (comments) Logan's tendonitis with rupture Ciprofloxacin Medium   Other (comments)  
 tendonitis Pcn [Penicillins]  04/08/2010    Anaphylaxis Sulfa (Sulfonamide Antibiotics)  04/08/2010    Unknown (comments) Current Immunizations  Reviewed on 12/13/2017 Name Date Influenza High Dose Vaccine PF 9/26/2017, 10/1/2016 Pneumococcal Conjugate (PCV-13) 10/1/2016 Tdap 6/11/2013 ZZZ-RETIRED (DO NOT USE) Pneumococcal Vaccine (Unspecified Type) 5/13/2010, 1/1/2003 Zoster 1/1/2009 Reviewed by Chiquis Isidro MD on 12/13/2017 at 12:43 PM  
You Were Diagnosed With   
  
 Codes Comments Medicare annual wellness visit, subsequent    -  Primary ICD-10-CM: Z00.00 ICD-9-CM: V70.0 Vitals BP Pulse Temp Resp Height(growth percentile) SpO2  
 153/79 (BP 1 Location: Left arm, BP Patient Position: Sitting) 62 98.5 °F (36.9 °C) (Oral) 18 5' 10\" (1.778 m) 96% Smoking Status Never Smoker Preferred Pharmacy Pharmacy Name Phone [address] [place] [address] [phone] Your Updated Medication List  
  
   
This list is accurate as of: 12/13/17 12:49 PM.  Always use your most recent med list.  
  
  
  
  
 * acetaminophen 325 mg tablet Commonly known as:  TYLENOL Take 2 Tabs by mouth three (3) times daily. * acetaminophen 500 mg tablet Commonly known as:  TYLENOL Take 1 Tab by mouth daily as needed for Pain. * albuterol 90 mcg/actuation inhaler Commonly known as:  PROVENTIL HFA, VENTOLIN HFA, PROAIR HFA Take 2 Puffs by inhalation every four (4) hours as needed. * albuterol 2.5 mg /3 mL (0.083 %) nebulizer solution Commonly known as:  PROVENTIL VENTOLIN  
3 mL by Nebulization route every four (4) hours as needed. For wheezing    **DX:J44.9**  
  
 amLODIPine 5 mg tablet Commonly known as:  Zena Colon Take 1 Tab by mouth daily. budesonide-formoterol 160-4.5 mcg/actuation Hfaa Commonly known as:  SYMBICORT Take 2 Puffs by inhalation two (2) times a day. cholecalciferol 1,000 unit tablet Commonly known as:  VITAMIN D3 Take 1 Tab by mouth daily. citalopram 20 mg tablet Commonly known as:  Tempie Christian Take 1 Tab by mouth daily. diclofenac 1 % Gel Commonly known as:  VOLTAREN Apply  to affected area four (4) times daily as needed. guaiFENesin  mg ER tablet Commonly known as:  David & David Take 1 Tab by mouth daily. Inulin-Sorbitol 2 gram Chew Commonly known as:  Fiber Take 2 g by mouth daily. memantine-donepezil 28-10 mg Cspx Take 1 Cap by mouth nightly. QUEtiapine 25 mg tablet Commonly known as:  SEROquel Take 1 Tab by mouth nightly. * Notice: This list has 4 medication(s) that are the same as other medications prescribed for you. Read the directions carefully, and ask your doctor or other care provider to review them with you. Patient Instructions Medicare Wellness Visit, Male The best way to live healthy is to have a healthy lifestyle by eating a well-balanced diet, exercising regularly, limiting alcohol and stopping smoking. Regular physical exams and screening tests are another way to keep healthy. Preventive exams provided by your health care provider can find health problems before they become diseases or illnesses. Preventive services including immunizations, screening tests, monitoring and exams can help you take care of your own health. All people over age 72 should have a pneumovax  and and a prevnar shot to prevent pneumonia. These are once in a lifetime unless you and your provider decide differently. All people over 65 should have a yearly flu shot and a tetanus vaccine every 10 years. Screening for diabetes mellitus with a blood sugar test should be done every year. Glaucoma is a disease of the eye due to increased ocular pressure that can lead to blindness and it should be done every year by an eye professional. 
 
Cardiovascular screening tests that check for elevated lipids (fatty part of blood) which can lead to heart disease and strokes should be done every 5 years. Colorectal screening that evaluates for blood or polyps in your colon should be done yearly as a stool test or every five years as a flexible sigmoidoscope or every 10 years as a colonoscopy up to age 76. Men up to age 76 may need a screening blood test for prostate cancer at certain intervals, depending on their personal and family history. This decision is between the patient and his provider. If you have been a smoker or had family history of abdominal aortic aneurysms, you and your provider may decide to schedule an ultrasound test of your aorta. Hepatitis C screening is also recommended for anyone born between [birth year] through [address]. A shingles vaccine is also recommended once in a lifetime after age 61. Your Medicare Wellness Exam is recommended annually. Here is a list of your current Health Maintenance items with a due date: Health Maintenance Due Topic Date Due  Glaucoma Screening   06/30/2015 [facility] Annual Well Visit  10/12/2017 Introducing [facility] HE[facility]! Dear Bette Mckeon: Thank you for requesting a Finsphere account. Our records indicate that you already have an active Finsphere account. You can access your account anytime at [email or web address] Did you know that you can access your hospital and ER discharge instructions at any time in Finsphere? You can also review all of your test results from your hospital stay or ER visit. Additional Information If you have questions, please visit the Frequently Asked Questions section of the Finsphere website at [email or web address] Remember, Finsphere is NOT to be used for urgent needs. For medical emergencies, dial 911. Now available from your iPhone and Android! Please provide this summary of care documentation to your next provider. Your primary care clinician is listed as Teresa Stewart. If you have any questions after today's visit, please call [phone].

## 2017-12-13 NOTE — PROGRESS NOTES
Met with patient and wife today in the office. Dr. Fer Colmenares nurse removed patient's dressing from R arm. Areas of skin were pink in color and had a skin-tear like appearance. Stitches removed yesterday and area has healed very nicely. Elbow appears swollen and according to Dr. Fer Colmenares nurse and patient, warm to touch. Dr. Charline Lee to evaluate. Reviewed medications with patient's wife and Dr. Charline Lee; he printed all Rxs and patient's wife will give to [facility] to submit to their pharmacy. Dr. Charline Lee has re-started Seroquel QHS, but not the 1/2 tab in the am and pm d/t potential for increased risk for falls. The patient continues to remain in the [facility] and gets up only with assistance from others. Patient's wife has this NN's direct number and will call as needed. Goals Addressed             Most Recent     Patient will work with PT to increase strength/balance/mobility (pt-stated)   On track (12/13/2017)             12/12/17: Patient will work with PT at [address] [place] [address] [employer]. Currently very unsteady and using a walker. DME paperwork for obtain a WC has been faxed to At [facility] to complete and send to [facility].  -SRW

## 2017-12-14 ENCOUNTER — TELEPHONE (OUTPATIENT)
Dept: INTERNAL MEDICINE CLINIC | Age: 82
End: 2017-12-14

## 2017-12-19 ENCOUNTER — PATIENT OUTREACH (OUTPATIENT)
Dept: INTERNAL MEDICINE CLINIC | Age: 82
End: 2017-12-19

## 2017-12-19 RX ORDER — ACETAMINOPHEN 500 MG
500 TABLET ORAL
COMMUNITY
End: 2018-01-10 | Stop reason: DRUGHIGH

## 2017-12-19 RX ORDER — OXYCODONE HYDROCHLORIDE 5 MG/1
5 CAPSULE ORAL
COMMUNITY
End: 2018-01-10

## 2017-12-19 NOTE — PROGRESS NOTES
Patient was in [address] [place] [address] ED 12/17 s/p fall with L clavicle fracture. D/t healing R elbow fracture and now L clavicle fracture, decision was made to d/c to [facility] SNF. Contacted patient's nurse at [facility] and reviewed all medications. She states list includes mamentadine-donepazil combination in addition to Aricept and atorvastatin. Gave TORB to d/c Aricept and atorvastatin and to order albuterol neb treatments Q4H PRN & Mucinex 600 mg daily, per Dr. Ace Schlatter detailed med rec with patient's wife at most recent office visit on 12/13. Notified Dr. Jarrod Delarosa of admission to rehab and orders given. Plan to f/u with patient's wife/SW at [facility] in the next couple of weeks for an update.

## 2018-01-01 ENCOUNTER — OFFICE VISIT (OUTPATIENT)
Dept: INTERNAL MEDICINE CLINIC | Age: 83
End: 2018-01-01

## 2018-01-01 ENCOUNTER — PATIENT OUTREACH (OUTPATIENT)
Dept: INTERNAL MEDICINE CLINIC | Age: 83
End: 2018-01-01

## 2018-01-01 ENCOUNTER — TELEPHONE (OUTPATIENT)
Dept: INTERNAL MEDICINE CLINIC | Age: 83
End: 2018-01-01

## 2018-01-01 VITALS
TEMPERATURE: 96.1 F | DIASTOLIC BLOOD PRESSURE: 96 MMHG | HEART RATE: 65 BPM | RESPIRATION RATE: 18 BRPM | SYSTOLIC BLOOD PRESSURE: 171 MMHG | OXYGEN SATURATION: 96 % | HEIGHT: 70 IN | WEIGHT: 141 LBS | BODY MASS INDEX: 20.19 KG/M2

## 2018-01-01 VITALS
TEMPERATURE: 98.2 F | HEIGHT: 70 IN | SYSTOLIC BLOOD PRESSURE: 143 MMHG | DIASTOLIC BLOOD PRESSURE: 91 MMHG | RESPIRATION RATE: 16 BRPM | WEIGHT: 142.8 LBS | HEART RATE: 81 BPM | BODY MASS INDEX: 20.44 KG/M2 | OXYGEN SATURATION: 97 %

## 2018-01-01 VITALS
HEART RATE: 64 BPM | HEIGHT: 70 IN | BODY MASS INDEX: 20.27 KG/M2 | SYSTOLIC BLOOD PRESSURE: 158 MMHG | DIASTOLIC BLOOD PRESSURE: 90 MMHG | RESPIRATION RATE: 14 BRPM | TEMPERATURE: 97.2 F | OXYGEN SATURATION: 98 % | WEIGHT: 141.6 LBS

## 2018-01-01 DIAGNOSIS — Z23 ENCOUNTER FOR IMMUNIZATION: Primary | ICD-10-CM

## 2018-01-01 DIAGNOSIS — F34.1 DYSTHYMIA: ICD-10-CM

## 2018-01-01 DIAGNOSIS — R13.10 DYSPHAGIA, UNSPECIFIED TYPE: Primary | ICD-10-CM

## 2018-01-01 DIAGNOSIS — C43.9 MALIGNANT MELANOMA, UNSPECIFIED SITE (HCC): ICD-10-CM

## 2018-01-01 DIAGNOSIS — F02.818 DEMENTIA ASSOCIATED WITH OTHER UNDERLYING DISEASE WITH BEHAVIORAL DISTURBANCE: Primary | ICD-10-CM

## 2018-01-01 DIAGNOSIS — G30.0 EARLY ONSET ALZHEIMER'S DEMENTIA WITHOUT BEHAVIORAL DISTURBANCE (HCC): ICD-10-CM

## 2018-01-01 DIAGNOSIS — F05 SUNDOWNING: Primary | ICD-10-CM

## 2018-01-01 DIAGNOSIS — F02.818 DEMENTIA ASSOCIATED WITH OTHER UNDERLYING DISEASE WITH BEHAVIORAL DISTURBANCE: ICD-10-CM

## 2018-01-01 DIAGNOSIS — M75.51 BILATERAL SHOULDER BURSITIS: ICD-10-CM

## 2018-01-01 DIAGNOSIS — M25.512 LEFT SHOULDER PAIN, UNSPECIFIED CHRONICITY: ICD-10-CM

## 2018-01-01 DIAGNOSIS — F05 SUNDOWNING: ICD-10-CM

## 2018-01-01 DIAGNOSIS — M75.52 BILATERAL SHOULDER BURSITIS: ICD-10-CM

## 2018-01-01 DIAGNOSIS — Z79.899 HIGH RISK MEDICATION USE: ICD-10-CM

## 2018-01-01 DIAGNOSIS — M70.21 OLECRANON BURSITIS OF RIGHT ELBOW: Primary | ICD-10-CM

## 2018-01-01 DIAGNOSIS — F02.80 EARLY ONSET ALZHEIMER'S DEMENTIA WITHOUT BEHAVIORAL DISTURBANCE (HCC): ICD-10-CM

## 2018-01-01 RX ORDER — QUETIAPINE FUMARATE 50 MG/1
TABLET, FILM COATED ORAL
Qty: 60 TAB | Refills: 2
Start: 2018-01-01

## 2018-01-01 RX ORDER — ATORVASTATIN CALCIUM 10 MG/1
10 TABLET, FILM COATED ORAL
COMMUNITY
Start: 2017-09-05

## 2018-01-01 RX ORDER — TRAZODONE HYDROCHLORIDE 50 MG/1
50 TABLET ORAL
Qty: 30 TAB | Refills: 2 | Status: SHIPPED | OUTPATIENT
Start: 2018-01-01 | End: 2018-01-01

## 2018-01-01 RX ORDER — OXYCODONE AND ACETAMINOPHEN 5; 325 MG/1; MG/1
1 TABLET ORAL AS NEEDED
COMMUNITY
Start: 2017-09-19 | End: 2018-01-01 | Stop reason: ALTCHOICE

## 2018-01-01 RX ORDER — HYDROCORTISONE 25 MG/G
CREAM TOPICAL AS NEEDED
COMMUNITY

## 2018-01-01 RX ORDER — CITALOPRAM 20 MG/1
20 TABLET, FILM COATED ORAL DAILY
Qty: 30 TAB | Refills: 5 | Status: SHIPPED | OUTPATIENT
Start: 2018-01-01

## 2018-01-01 RX ORDER — QUETIAPINE FUMARATE 50 MG/1
100 TABLET, FILM COATED ORAL
Qty: 60 TAB | Refills: 2 | Status: SHIPPED | OUTPATIENT
Start: 2018-01-01 | End: 2018-01-01 | Stop reason: ALTCHOICE

## 2018-01-01 RX ORDER — TRAZODONE HYDROCHLORIDE 100 MG/1
100 TABLET ORAL
Qty: 30 TAB | Refills: 5 | Status: SHIPPED | OUTPATIENT
Start: 2018-01-01

## 2018-01-01 RX ORDER — DICLOFENAC SODIUM 10 MG/G
GEL TOPICAL AS NEEDED
COMMUNITY
Start: 2017-09-19

## 2018-01-01 RX ORDER — ALBUTEROL SULFATE 90 UG/1
AEROSOL, METERED RESPIRATORY (INHALATION)
Qty: 1 INHALER | Refills: 11 | Status: SHIPPED | OUTPATIENT
Start: 2018-01-01

## 2018-01-02 ENCOUNTER — PATIENT OUTREACH (OUTPATIENT)
Dept: INTERNAL MEDICINE CLINIC | Age: 83
End: 2018-01-02

## 2018-01-02 NOTE — PROGRESS NOTES
Contacted [facility] and verified that this pt is still admitted. Contacted [facility] and verified that the plan is to d/c pt on 1/9 back to [facility].

## 2018-01-03 NOTE — PROGRESS NOTES
1/3: Received notification today that pt has been admitted to [facility]. Verified in [facility] records that pt was admitted 1/2 for removal of hardware d/t failed fixation of clavicle and wound infection. Plan to f/u after discharge if necessary, otherwise will f/u after d/c from [facility].

## 2018-01-05 LAB — CREATININE, EXTERNAL: 0.72

## 2018-01-10 ENCOUNTER — PATIENT OUTREACH (OUTPATIENT)
Dept: INTERNAL MEDICINE CLINIC | Age: 83
End: 2018-01-10

## 2018-01-10 RX ORDER — ACETAMINOPHEN 325 MG/1
650 TABLET ORAL
COMMUNITY

## 2018-01-10 RX ORDER — ALPRAZOLAM 0.5 MG/1
0.5 TABLET ORAL
COMMUNITY
End: 2018-02-20 | Stop reason: ALTCHOICE

## 2018-01-10 RX ORDER — TAMSULOSIN HYDROCHLORIDE 0.4 MG/1
0.4 CAPSULE ORAL DAILY
COMMUNITY
End: 2018-02-26 | Stop reason: ALTCHOICE

## 2018-01-10 NOTE — PROGRESS NOTES
Patient was admitted to [facility] 1/2-1/9 for debridement of L shoulder, removal of hardware of L clavicle, & coraclivicular fixation with tendon allograft with fibretape augmentation. Wound infection, cx + for pseudomonas Aeruginosa. Pt saw Dr. David Salazar outpatient on day of admission and presented with early failure of fixation w/ later part of plate protruding through skin. PICC line placed, will receive IV abx through 2/13. PLAN: Discharged back to [facility] SNF/Rehab. Appt scheduled today 1/10 with Dr. Huy Vora @ 3:15 pm.  NEW MEDICATIONS: tamsulosin, IV abx  DISCONTINUED MEDICATIONS: diclofenac gel, Mucinex, Tramadol, oxycodone/APAP, Toradol  Seroquel is not listed on discharge med list    Contacted pt's wife Bozena Drummond to inquire about appointment scheduled today and decision was made to cancel and f/u after discharge from rehab. Contacted nurse Willard Simmons at [facility] and requested medication list. Received list via fax and updated Med Rec. Seroquel is not on his current list. Alprazolam has been ordered prn.     1/11: Contacted nurse at [facility] and verified that pt is receiving IV cefepime, starting today. No other IV abx. Goals Addressed             Most Recent     Patient will work with PT to increase strength/balance/mobility (pt-stated)   On track (1/10/2018)             1/11/18: Pt admitted to [facility] for SNF/rehab. Receiving IV abx via PICC. -SRW    12/12/17: Patient will work with PT at [employer]. Currently very unsteady and using a walker. DME paperwork for obtain a WC has been faxed to At [address] [place] [address] to complete and send to [facility].  -SRW

## 2018-01-29 ENCOUNTER — PATIENT OUTREACH (OUTPATIENT)
Dept: INTERNAL MEDICINE CLINIC | Age: 83
End: 2018-01-29

## 2018-01-29 NOTE — PROGRESS NOTES
Contacted [facility] and verified that patient is still admitted. Left detailed VMMs for Kathrin Kohler and Sergo Sierra, requesting a return call to discuss discharge plans. Received return call from [facility] reporting the patient will be receiving IV abx until 2/13, at which time he will most likely be discharged. He has declined a great deal and is now WC bound and staying at the nurses station majority of the time due to frequent falls and getting up without assistance. They are not able to use restraints on him. She will call this  if there are any changes to d/c plans. Goals Addressed             Most Recent     Patient will work with PT to increase strength/balance/mobility (pt-stated)   Worsening (1/29/2018)             1/29/18: Patient currently WC bound at [address] staying at the nurses station majority of the time due frequent falls and getting up without assistance. Plan to complete abx 2/13 and discharge at that time. -SRW    1/11/18: [facility]rehab. Receiving IV abx via PICC. -SRW    12/12/17: Patient will work with PT at [address] [place] [address] [employer]. Currently very unsteady and using a walker. DME paperwork for obtain a WC has been faxed to At [address] [place] [address] to complete and send to [facility].  -SRW

## 2018-02-13 ENCOUNTER — PATIENT OUTREACH (OUTPATIENT)
Dept: INTERNAL MEDICINE CLINIC | Age: 83
End: 2018-02-13

## 2018-02-13 NOTE — PROGRESS NOTES
Contacted [facility] and verified that patient is still admitted. Attempted to contact the  for an update on plan of care. Unable to reach. Left detailed VMM requesting a return call. Received a VMM from Duane Johnson at [place], stating the planned d/c date is now Sunday 2/18.

## 2018-02-20 ENCOUNTER — PATIENT OUTREACH (OUTPATIENT)
Dept: INTERNAL MEDICINE CLINIC | Age: 83
End: 2018-02-20

## 2018-02-20 ENCOUNTER — TELEPHONE (OUTPATIENT)
Dept: INTERNAL MEDICINE CLINIC | Age: 83
End: 2018-02-20

## 2018-02-20 RX ORDER — GUAIFENESIN 600 MG/1
600 TABLET, EXTENDED RELEASE ORAL DAILY
COMMUNITY
End: 2018-04-23 | Stop reason: ALTCHOICE

## 2018-02-20 RX ORDER — QUETIAPINE FUMARATE 25 MG/1
75 TABLET, FILM COATED ORAL
COMMUNITY
End: 2018-04-30 | Stop reason: SDUPTHER

## 2018-02-20 RX ORDER — LANOLIN ALCOHOL/MO/W.PET/CERES
3 CREAM (GRAM) TOPICAL
COMMUNITY
End: 2018-03-01 | Stop reason: ALTCHOICE

## 2018-02-20 NOTE — MR AVS SNAPSHOT
[address] [place] [address] [address] [facility] 57 
[phone] Patient: Wan Caballero MRN: [id number] [facility]:[birth year] Visit Information Date & Time Provider Department Dept. Phone Encounter #  
 2/20/2018 10:07 AM Zhou Poe [facility] [phone] [id number] Upcoming Health Maintenance Date Due  
 GLAUCOMA SCREENING Q2Y 6/30/2015 MEDICARE YEARLY EXAM 12/14/2018 DTaP/Tdap/Td series (2 - Td) 6/11/2023 Allergies as of 2/20/2018  Review Complete On: 12/13/2017 By: John Scruggs LPN Severity Noted Reaction Type Reactions Levaquin [Levofloxacin] High 04/04/2016    Other (comments) Sherrill's tendonitis with rupture Ciprofloxacin Medium   Other (comments)  
 tendonitis Pcn [Penicillins]  04/08/2010    Anaphylaxis Sulfa (Sulfonamide Antibiotics)  04/08/2010    Unknown (comments) Current Immunizations  Reviewed on 12/13/2017 Name Date Influenza High Dose Vaccine PF 9/26/2017, 10/1/2016 Pneumococcal Conjugate (PCV-13) 10/1/2016 Tdap 6/11/2013 ZZZ-RETIRED (DO NOT USE) Pneumococcal Vaccine (Unspecified Type) 5/13/2010, 1/1/2003 Zoster 1/1/2009 Not reviewed this visit Vitals Smoking Status Never Smoker Preferred Pharmacy Pharmacy Name Phone [address] [address] [phone] Your Updated Medication List  
  
   
This list is accurate as of: 2/20/18 10:28 AM.  Always use your most recent med list.  
  
  
  
  
 acetaminophen 325 mg tablet Commonly known as:  TYLENOL Take 650 mg by mouth every six (6) hours as needed for Pain. * albuterol 90 mcg/actuation inhaler Commonly known as:  PROVENTIL HFA, VENTOLIN HFA, PROAIR HFA Take 2 Puffs by inhalation every four (4) hours as needed. * albuterol 2.5 mg /3 mL (0.083 %) nebulizer solution Commonly known as:  PROVENTIL VENTOLIN  
3 mL by Nebulization route every four (4) hours as needed. For wheezing    **DX:J44.9**  
  
 amLODIPine 5 mg tablet Commonly known as:  Prather Kathe Take 1 Tab by mouth daily. budesonide-formoterol 160-4.5 mcg/actuation Hfaa Commonly known as:  SYMBICORT Take 2 Puffs by inhalation two (2) times a day. cholecalciferol 1,000 unit tablet Commonly known as:  VITAMIN D3 Take 1 Tab by mouth daily. citalopram 20 mg tablet Commonly known as:  Mitch Srikanth Take 1 Tab by mouth daily. FIBER CHOICE (INULIN) 1.5 gram Blanchie Plush Generic drug:  inulin Take 1 Tab by mouth daily. melatonin 3 mg tablet Take 3 mg by mouth nightly. memantine-donepezil 28-10 mg Cspx Take 1 Cap by mouth daily. MUCINEX 600 mg ER tablet Generic drug:  guaiFENesin ER Take 600 mg by mouth daily. SEROquel 25 mg tablet Generic drug:  QUEtiapine Take 25 mg by mouth nightly. tamsulosin 0.4 mg capsule Commonly known as:  FLOMAX Take 0.4 mg by mouth daily. * Notice: This list has 2 medication(s) that are the same as other medications prescribed for you. Read the directions carefully, and ask your doctor or other care provider to review them with you. Introducing [facility] & HEALTH SERVICES! Dear Radha Da Silva: Thank you for requesting a Lamoda account. Our records indicate that you already have an active Lamoda account. You can access your account anytime at [email or web address]. iMotions - Eye Tracking/[address] Did you know that you can access your hospital and ER discharge instructions at any time in Lamoda? You can also review all of your test results from your hospital stay or ER visit. Additional Information If you have questions, please visit the Frequently Asked Questions section of the Lamoda website at [email or web address]. iMotions - Eye Tracking/[address]/. Remember, Lamoda is NOT to be used for urgent needs. For medical emergencies, dial 911. Now available from your iPhone and Android! Please provide this summary of care documentation to your next provider. Your primary care clinician is listed as Aleah Foster. If you have any questions after today's visit, please call [phone].

## 2018-02-20 NOTE — PROGRESS NOTES
Hospital Discharge Follow-Up      Date/Time: 2018 10: 30 AM    Nurse Navigator(NN) contacted the nurse, Finalucia Simpsonson, at [facility]  by telephone to perform post hospital discharge assessment. Verified name and  with Tosin Heart as identifiers. Provided introduction to self, and explanation of the Nurse Navigator role. Nurse Navigator attempted to contact patient's wife Melvin for ESPINOZA ErieS follow up and to schedule appointment with Dr. Jon Urias. Unable to reach. Left detailed VMM requesting a return call. Date/Time: 2018 9:45 AM    NN contacted Melvin and scheduled ANTONIO appointment with Dr. Jon Urias on  @ 1:15 PM.    Patient was admitted to [facility] - for debridement of L shoulder, removal of hardware of L clavicle, & coraclivicular fixation with tendon allograft with fibretape augmentation. Wound infection, cx + for pseudomonas Aeruginosa. Pt saw Dr. Kati Palmer outpatient on day of admission and presented with early failure of fixation w/ later part of plate protruding through skin. PICC line placed, will receive IV abx through . Patient was admitted to [facility] SNF -. Discharged home to [address] [place] [address]. Top challenges reviewed with the provider:  Frequent falls: pt fell again today and hit his head, bruise to left eye, when pt returned to [facility]  he had a bruise on his R eye    Method of communication with provider :face to face       The family agrees to contact the PCP office for questions related to their healthcare. NN provided contact information for future reference. Summary of patients top problems:  1.  Frequent Falls: confused at baseline, poor safety awareness, patient is now instructed to stay in a WC, he has been self-propelling in the WC, full range of motion in all extremities, fell this morning at [facility], PT/OT will work with pt       [address] orders at discharge: PT, OT, [address]: [facility]  Date of initial visit: scheduled today    Durable Medical Equipment ordered/company: [id number] received: yes    Barriers to care? confusion at baseline, poor safety awareness leading to frequent falls    Medication:   Medication reconciliation was performed with Swathi and Dr. Josef Vail. There were no barriers to obtaining medications identified at this time. New medications at discharge include melatonin, tamsulosin  Does the patient have new prescription(s) to last until follow up with prescribing provider: yes  Prescription Medication total: 13 (pharmacy consult for polypharm needed?) no   Medication changes (dose adjustments or discontinued meds): alprazolam discontinued by another provider prior to most recent admission, Seroquel restarted by patient's neurologist    Advance Care Planning:   Does patient have an Advance Directive:  unable to review at this time     PCP/Specialist follow up:   Future Appointments  Date Time Provider Mike Hinojosa   2/26/2018 1:15 PM MD Chas Rock    This NN will be out of the office on 2/26. Plan to f/u with patient's wife as needed for ANTONIO. Goals      Patient will work with PT to increase strength/balance/mobility (pt-stated)            2/20/18: Patient back at [facility]. [facility] PT/OT/ST to work with patient. WC bound and able to self propel. Full range of motion in all extremities.  -SRW

## 2018-02-20 NOTE — TELEPHONE ENCOUNTER
Provided Dale with a verbal order per Dr Pako Cook home PT. He states that the drug interaction is between Celexa, Namzaric, and Seroquel. He has to make the office aware.

## 2018-02-20 NOTE — TELEPHONE ENCOUNTER
Would like a verbal for home pt Twice a week for 4 weeks. Patient is taking  seroquel  norvac,quetiapine are inner acting with each other .  If the nurse could call Jessica Rivera back [phone]

## 2018-02-21 ENCOUNTER — TELEPHONE (OUTPATIENT)
Dept: INTERNAL MEDICINE CLINIC | Age: 83
End: 2018-02-21

## 2018-02-21 NOTE — TELEPHONE ENCOUNTER
Saurav Mendieta from [facility], stated the patient fell twice yesterday and ripped his skin and wanted to make sure it was ok  to send a nurse to do a skin assessment.  Contact is [phone]                From answering service

## 2018-02-21 NOTE — TELEPHONE ENCOUNTER
FYI  :   Homehealth nurse saw patient and evaluated patient. The cognitive score was 4.2. Patient is having swallowing issues and coughing when he drinks. She will be seeing him 2x week for 5 weeks for memory care and swallowing.

## 2018-02-21 NOTE — PROGRESS NOTES
Date/Time: 2/21/18 10:30 AM    Received a message that [facility] [address] Mabel Raymundo called. Contacted Mabel Raymundo and introduced self and NN role. She reports she observed patient at the end of breakfast today with some swallowing issues. She is not sure if this may be r/t fatigue, so she will continue to monitor him closely, and if diet change is needed she will notify Dr. Kat Grove and obtain the order to change diet at the [facility]. Mabel Ronquillova reports the patient's score of 4.2 on the [employer] screening tool indicates that he needs 24 hour supervision. HH will be working with the [facility] staff to see if there are any modifications that can be made to the patient's environment to make it safer. They will also be speaking to his wife about the potential option of switching the patient to their memory care unit. Mabel Raymundo observed that his wife needs a lot of education regarding the pt's mental capabilities. The [facility] reported to Mabel Raymundo that the patient had two more falls night resulting in a skin tear on his R forearm and back of L upper arm. The [facility] nurse will evaluate the patient for potential wound care needs. Will update Dr. Kat Grove in preparation for ALEXIS FUENTES appointment on Monday.

## 2018-02-22 ENCOUNTER — TELEPHONE (OUTPATIENT)
Dept: INTERNAL MEDICINE CLINIC | Age: 83
End: 2018-02-22

## 2018-02-22 NOTE — TELEPHONE ENCOUNTER
Mireya Evans would like to know if her  can stop taking Flomax. He was originally prescribed the medication following surgery. However, he is now getting up multiple times at night which he never did before. Please advise.

## 2018-02-23 ENCOUNTER — TELEPHONE (OUTPATIENT)
Dept: INTERNAL MEDICINE CLINIC | Age: 83
End: 2018-02-23

## 2018-02-23 ENCOUNTER — PATIENT OUTREACH (OUTPATIENT)
Dept: INTERNAL MEDICINE CLINIC | Age: 83
End: 2018-02-23

## 2018-02-23 NOTE — PROGRESS NOTES
Received message that Rocky Carrizales from [facility] called and requested to speak to this NN. Attempted to contact. Unable to reach. LVMM requesting a return call.

## 2018-02-26 ENCOUNTER — OFFICE VISIT (OUTPATIENT)
Dept: INTERNAL MEDICINE CLINIC | Age: 83
End: 2018-02-26

## 2018-02-26 VITALS
SYSTOLIC BLOOD PRESSURE: 120 MMHG | WEIGHT: 133 LBS | OXYGEN SATURATION: 98 % | TEMPERATURE: 97.4 F | BODY MASS INDEX: 19.04 KG/M2 | RESPIRATION RATE: 15 BRPM | HEIGHT: 70 IN | DIASTOLIC BLOOD PRESSURE: 60 MMHG | HEART RATE: 71 BPM

## 2018-02-26 DIAGNOSIS — F02.818 DEMENTIA ASSOCIATED WITH OTHER UNDERLYING DISEASE WITH BEHAVIORAL DISTURBANCE: ICD-10-CM

## 2018-02-26 DIAGNOSIS — S46.391A OTHER INJURY OF MUSCLE, FASCIA AND TENDON OF TRICEPS, RIGHT ARM, INITIAL ENCOUNTER: ICD-10-CM

## 2018-02-26 DIAGNOSIS — R29.6 REPEATED FALLS: ICD-10-CM

## 2018-02-26 DIAGNOSIS — Z79.899 POLYPHARMACY: ICD-10-CM

## 2018-02-26 DIAGNOSIS — R63.4 WEIGHT LOSS, ABNORMAL: ICD-10-CM

## 2018-02-26 DIAGNOSIS — F03.91 DEMENTIA WITH BEHAVIORAL DISTURBANCE, UNSPECIFIED DEMENTIA TYPE: Primary | ICD-10-CM

## 2018-02-26 RX ORDER — DONEPEZIL HYDROCHLORIDE 10 MG/1
10 TABLET, FILM COATED ORAL
Qty: 90 TAB | Refills: 3 | Status: SHIPPED | OUTPATIENT
Start: 2018-02-26 | End: 2018-02-26 | Stop reason: SDUPTHER

## 2018-02-26 RX ORDER — MEMANTINE HYDROCHLORIDE 10 MG/1
10 TABLET ORAL DAILY
Qty: 180 TAB | Refills: 3 | Status: SHIPPED | OUTPATIENT
Start: 2018-02-26 | End: 2018-05-15 | Stop reason: SDUPTHER

## 2018-02-26 RX ORDER — MEMANTINE HYDROCHLORIDE 10 MG/1
10 TABLET ORAL DAILY
Qty: 180 TAB | Refills: 3 | Status: SHIPPED | OUTPATIENT
Start: 2018-02-26 | End: 2018-02-26 | Stop reason: SDUPTHER

## 2018-02-26 RX ORDER — DONEPEZIL HYDROCHLORIDE 10 MG/1
10 TABLET, FILM COATED ORAL
Qty: 90 TAB | Refills: 3 | Status: SHIPPED | OUTPATIENT
Start: 2018-02-26 | End: 2018-05-15 | Stop reason: SDUPTHER

## 2018-02-26 NOTE — PROGRESS NOTES
Mr. iLnda Kaye is a 82y.o. year old male, he is seen today for Transition of Care services following a hospital discharge for left clavicle fracture on 2/20/18. Our office Nurse Navigator performed an outreach to Mr. Conner Pitts on 2/20/18 (within 2 business days of discharge) to complete medication reconciliation and a telephonic assessment of his condition. Patient was admitted to [facility] 1/2-1/9 for debridement of L shoulder, removal of hardware of L clavicle, & coraclivicular fixation with tendon allograft with fibretape augmentation. Wound infection, cx + for pseudomonas Aeruginosa. Pt saw Dr. Wilton Villarreal outpatient on day of admission and presented with early failure of fixation w/ later part of plate protruding through skin. PICC line placed, will receive IV abx through 2/13.     Patient was admitted to [facility] 1/9-2/18. Discharged home to [facility].     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 The family cannot afford the very expensive Namzaric. They are asking for generic version. The patient has had multiple falls, his speech is worsening, his ability to communicate is harder, his ambulatory ability is very poor. He's back in assisted living at a regular unit. He takes one Seroquel at night to keep him from getting agitated. He was restarted on blood pressure meds and Flomax while in the hospital in rehab. He had some urinary issues after surgery. I discontinued the Flomax over the phone last week. He's been on Amlodipine 5 and I have previously discontinued his blood pressure medications. He doesn't need antihypertensives, they increase his fall risk. Recently left clavicle fracture with another fall and then hardware had to be removed.   He had a pseudomonas culture, but wound has healed nicely. He was treated with IV antibiotics for a while with a PICC line. They were continued through February 13th. He's had no GI symptoms. He's had no bowel symptoms. His appetite is poor. His depression is stable. His sleep is good. His wife is frazzled to no end. Physical Examination:  His blood pressure was only 120/60, regular rhythm, clear lungs. Left shoulder ROM poor. Right arm - the triceps muscle has detached from elbow so he has no triceps muscle function on the right. He cannot  food normally. He has a soft abdomen. He's in a wheelchair. He has multiple wounds on his face. Impression/Plan:  1. High fall risk. Discontinue Amlodipine. Cost of Namziric is outrageous. Will switch him to Aricept 10, one at bedtime, and Namenda 10 b.i.d. Consider discontinuing both of these with their poor efficacy. Everything is being done to try to prevent falls. His dementia is progressive, his speech is worse. Trying to keep him happy, his family is attentive. They are considering moving him to a memory unit, but family would rather not. Wife is helping as much as she can.    2. His COPD/asthma has been stable for the present time. 3. He's getting PT/OT at the facility. I will see him back on a prn basis. Vitals:    02/26/18 1319 02/26/18 1332   BP: 122/73 120/60   Pulse: 71    Resp: 15    Temp: 97.4 °F (36.3 °C)    TempSrc: Oral    SpO2: 98%    Weight: 133 lb (60.3 kg)    Height: 5' 10\" (1.778 m)      1. Dementia associated with other underlying disease with behavioral disturbance  progressive  - donepezil (ARICEPT) 10 mg tablet; Take 1 Tab by mouth nightly. Indications: MODERATE TO SEVERE ALZHEIMER'S TYPE DEMENTIA  Dispense: 90 Tab; Refill: 3  - memantine (NAMENDA) 10 mg tablet; Take 1 Tab by mouth daily. Indications: MODERATE TO SEVERE ALZHEIMER'S TYPE DEMENTIA  Dispense: 180 Tab; Refill: 3    2. Polypharmacy  Reduce meds now    3. Weight loss, abnormal  Not sure if from aricept    4.  Other injury of muscle, fascia and tendon of triceps, right arm, initial encounter  Advise no further surgery

## 2018-03-01 ENCOUNTER — PATIENT OUTREACH (OUTPATIENT)
Dept: INTERNAL MEDICINE CLINIC | Age: 83
End: 2018-03-01

## 2018-03-01 NOTE — PROGRESS NOTES
Received VMM from pt's wife stating that [facility] is recommending melatonin 10 mg QHS and would need a Rx from Dr. Rosa Medeiros faxed to them. The patient was seen in the office this week on 2/26 for ANTONIO follow up. Dr. Rosa Medeiros changed Namzaric to Aricept and Namenda, which are cheaper when filled separately and he discontinued amlodipine. Confirmed medication changes with Dr. Rosa Medeiros and he gave the pt's wife Rxs with instructions. He does not want to increase melatonin d/t geriatric dose recommendation of 3 mg and pt is a high fall risk. Contacted the nurse Swathi at the [facility] and discussed melatonin. She requested an order faxed to them with instructions to discontinue. Faxed signed order from Dr. Rosa Medeiros and received fax confirmation. Confirmed that they received new medication orders from appointment on Monday 2/26. Reports the patient is doing much better cognitively. He currently is a 1:1 for 12 hours per day, which will decrease to 5 hours per day, 8 pm to 1 am this weekend, as these are his peak hours for fall risk. They will then readdress 1:1 status on Monday. They discussed with the wife the possibility of moving the patient to the memory care unit, but she is not interested. Contacted Mrs. Young and notified her that Dr. Rosa Medeiros is discontinuing melatonin. Inquired about moving patient to memory care and she states the reasons she is not interested are that there are no males that work there and they do not have as many activities for the patients. She feels her  would have a lot more stimulation in the [facility], which is what he needs.

## 2018-03-08 ENCOUNTER — PATIENT OUTREACH (OUTPATIENT)
Dept: INTERNAL MEDICINE CLINIC | Age: 83
End: 2018-03-08

## 2018-03-08 NOTE — PROGRESS NOTES
Patient was admitted to [facility] 3/7 s/p [facility] after tripping on a rug, which resulted in a head injury and hand injury. CT head positive for SDH and scalp hematoma, hand xray positive for fracture of 4th and 5th digits on left hand. Plan to follow up with XOCHITL/wife after discharge.

## 2018-03-19 ENCOUNTER — PATIENT OUTREACH (OUTPATIENT)
Dept: INTERNAL MEDICINE CLINIC | Age: 83
End: 2018-03-19

## 2018-03-19 RX ORDER — POLYETHYLENE GLYCOL 3350 17 G/17G
17 POWDER, FOR SOLUTION ORAL
COMMUNITY

## 2018-03-19 NOTE — PROGRESS NOTES
Patient was admitted to [address] [place] [address] 3/7-3/16 for GLF with SDH and hand injury, also had injfected R elbow with hardware removal. He was initially admitted to [facility] and transferred to [address] [place] [address] on 3/12. Patient was discharged to [facility] for rehab. Hospital Course:    1. MSSA Bacteremia: infected R elbow hardware s/p removal, abx bead placement 3/12 by Dr. Romana Aguillon, elbow cx +MSSA, continue IV Ancef through 4/22, surveillance cxs 3/12 negative, TTE with no vegetation    2. SDH: moderate size R>L, eval by neurosugery, significant cortical atrophy, no mass effect, poor baseline mentation w/ dementia, no intervention now, repeat head CT in 2 wks    3. Left 4th & 5th Digit Fracture: per ortho-conservative management, NWB, splint, PT/OT    4. Dementia: impulsivity leading to falls, on home Namenda, Aricept, Seroquel    MEDICATIONS: no changes, discontinued meds, or new meds. Mucinex daily and Fiber daily not listed on discharge summary. PLAN: [address] [place] [address] E for rehab and IV abx via R arm PICC. Instructions to elevate LUE as needed for swelling, NWB 4th & 5th digits, platform walker, regular diet, no NSAIDS/antiplatelets/AC unless cleared by neurosurgery. [facility] Dr. Emery Webster in 2 weeks, re-eval & repeat head CT. Chang Figueredo in 2 weeks. Ortho Dr. Dayron Hanks in 2 weeks. ANTONIO OUTREACH: Attempted to contact nurse at [address] [place] [address] E, unable to reach. Attempted to contact patient's wife, unable to reach, left detailed VMM requesting a return call.

## 2018-03-27 ENCOUNTER — PATIENT OUTREACH (OUTPATIENT)
Dept: INTERNAL MEDICINE CLINIC | Age: 83
End: 2018-03-27

## 2018-04-03 NOTE — PROGRESS NOTES
Patient's wife was seen in the office on 3/27 and she updated this NN on his 's progress at that time. She reports the plan is for the patient to return home after discharge from rehab with 24/7 care. She will be with him at night and will hire someone to stay with him during the day. She is unable to afford sending him back to [facility] because he would need 1:1 supervision. She will contact this NN once patient has been discharged from rehab.

## 2018-04-23 ENCOUNTER — PATIENT OUTREACH (OUTPATIENT)
Dept: INTERNAL MEDICINE CLINIC | Age: 83
End: 2018-04-23

## 2018-04-23 NOTE — PROGRESS NOTES
Hospital Discharge Follow-Up      Date/Time:  2018 3:18 PM    Patient was admitted to [address] [place] [address] 3/7-3/16 for GLF with SDH and hand injury, also had injfected R elbow with hardware removal. He was initially admitted to [facility] and transferred to [address] [place] [address] on 3/12.     Patient was discharged to [facility] for rehab.      The rehab physician discharge summary was not available at the time of outreach. Patient's wife was contacted same day of discharge. Top Challenges reviewed with the provider   Frequent Falls: pt fell during phone call today, now has a personal aide daily during the daytime to assist with ADLs, home health PT/OT ordered         Method of communication with provider :face to face-Plan to discuss at upcoming appointment. Inpatient RRAT score: n/a  Was this a readmission? no   Patient stated reason for the readmission: n/a    Nurse Navigator (NN) contacted the patient's wife Jonelle Estrada by telephone to perform post hospital discharge assessment. Verified name and  with wife as identifiers. Provided introduction to self, and explanation of the Nurse Navigator role. The patient now has an aide that will be staying with him daily from morning to evening. She will assist him with ADLs and fall prevention. Jonelle Estrada given an opportunity to ask questions and does not have any further questions or concerns at this time. The family agrees to contact the PCP office for questions related to their healthcare. NN provided contact information for future reference. Summary of patients top problems:    1. MSSA Bacteremia: infected R elbow hardware s/p removal, abx bead placement 3/12 by Dr. Kaylyn Madsen, elbow cx +MSSA, continue IV Ancef through , surveillance cxs 3/12 negative, TTE with no vegetation, wife reports elbow has not healed completely, but only needs a Bandaid applied at this time     2.  SDH: moderate size R>L, eval by neurosugery, significant cortical atrophy, no mass effect, poor baseline mentation w/ dementia, no intervention now, repeat head CT in 2 wks     3. Left 4th & 5th Digit Fracture: per ortho-conservative management, NWB, splint, PT/OT     4. Dementia: impulsivity leading to falls, on home Namenda, Aricept, Seroquel    Home Health orders at discharge: PT, OT, [address] [facility]: [facility]  Date of initial visit: TBD    Durable Medical Equipment ordered/company: none  Durable Medical Equipment received: n/a    Barriers to care? lack of knowledge about disease, level of motivation; patient has dementia and forgets to ask for assistance, multiple falls    Advance Care Planning:   Does patient have an Advance Directive:  plan to review at upcoming appointment       Medication:     New Medications at Discharge: none  Changed Medications at Discharge: none  Discontinued Medications at Discharge: none    Medication reconciliation was performed with patient's wife, who verbalizes understanding of administration of home medications. There were no barriers to obtaining medications identified at this time. Referral to Pharm D needed: no     Current Outpatient Prescriptions   Medication Sig    polyethylene glycol (MIRALAX) 17 gram packet Take 17 g by mouth daily as needed.  donepezil (ARICEPT) 10 mg tablet Take 1 Tab by mouth nightly. 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 Indications: MODERATE TO SEVERE ALZHEIMER'S TYPE DEMENTIA    inulin (FIBER CHOICE, INULIN,) 1.5 gram chew Take 1 Tab by mouth daily.  QUEtiapine (SEROQUEL) 25 mg tablet Take 75 mg by mouth nightly.  acetaminophen (TYLENOL) 325 mg tablet Take 650 mg by mouth every six (6) hours as needed for Pain.  albuterol (PROVENTIL HFA, VENTOLIN HFA, PROAIR HFA) 90 mcg/actuation inhaler Take 2 Puffs by inhalation every four (4) hours as needed.     albuterol (PROVENTIL VENTOLIN) 2.5 mg /3 mL (0.083 %) nebulizer solution 3 mL by Nebulization route every four (4) hours as needed. For wheezing    **DX:J44.9**    budesonide-formoterol (SYMBICORT) 160-4.5 mcg/actuation HFAA Take 2 Puffs by inhalation two (2) times a day.  cholecalciferol (VITAMIN D3) 1,000 unit tablet Take 1 Tab by mouth daily.  citalopram (CELEXA) 20 mg tablet Take 1 Tab by mouth daily. No current facility-administered medications for this visit. Medications Discontinued During This Encounter   Medication Reason    guaiFENesin ER (MUCINEX) 600 mg ER tablet Therapy Completed       PCP/Specialist follow up:   Future Appointments  Date Time Provider Mike Micaela   4/30/2018 4:15 PM MD Cherie Guillen Dr. Kalli Son 4/25 @ 10:15 AM  Orthopedics Dr. Gemini Trotter 5/3      Goals      Patient will work with PT to increase strength/balance/mobility (pt-stated)            4/23/18: Patient is back home from [facility] rehab. [facility] PT/OT has been ordered. He is mostly WC bound, but still gets up without asking for assistance. His wife states a goal for him to progress back to ambulating with the walker. -SRW    2/20/18: Patient back at [facility]. [facility]  PT/OT/ST to work with patient. WC bound and able to self propel. Full range of motion in all extremities.  -SRW

## 2018-04-30 ENCOUNTER — PATIENT OUTREACH (OUTPATIENT)
Dept: INTERNAL MEDICINE CLINIC | Age: 83
End: 2018-04-30

## 2018-04-30 ENCOUNTER — OFFICE VISIT (OUTPATIENT)
Dept: INTERNAL MEDICINE CLINIC | Age: 83
End: 2018-04-30

## 2018-04-30 VITALS
OXYGEN SATURATION: 97 % | DIASTOLIC BLOOD PRESSURE: 74 MMHG | RESPIRATION RATE: 16 BRPM | HEIGHT: 70 IN | BODY MASS INDEX: 19.61 KG/M2 | WEIGHT: 137 LBS | TEMPERATURE: 98.4 F | HEART RATE: 60 BPM | SYSTOLIC BLOOD PRESSURE: 126 MMHG

## 2018-04-30 DIAGNOSIS — S06.5X0A SUBDURAL HEMATOMA DUE TO CONCUSSION, WITHOUT LOSS OF CONSCIOUSNESS, INITIAL ENCOUNTER (HCC): Primary | ICD-10-CM

## 2018-04-30 DIAGNOSIS — R29.6 REPEATED FALLS: ICD-10-CM

## 2018-04-30 DIAGNOSIS — J44.9 CHRONIC OBSTRUCTIVE PULMONARY DISEASE, UNSPECIFIED COPD TYPE (HCC): ICD-10-CM

## 2018-04-30 DIAGNOSIS — F02.818 DEMENTIA ASSOCIATED WITH OTHER UNDERLYING DISEASE WITH BEHAVIORAL DISTURBANCE: ICD-10-CM

## 2018-04-30 RX ORDER — QUETIAPINE FUMARATE 25 MG/1
75 TABLET, FILM COATED ORAL
Qty: 90 TAB | Refills: 5 | Status: SHIPPED | OUTPATIENT
Start: 2018-04-30 | End: 2018-01-01 | Stop reason: SDUPTHER

## 2018-04-30 NOTE — PROGRESS NOTES
Patient seen along with Dr. Cynthia Carvalho. Asked to look at medications that could increase fall risk. Patient has had recent transitions of care and the only medication change that could increase fall risk would be the increased dose or seroquel. This was intially started at 12.5 mg last year and increased up to 25 mg daily in October. Increase in dose most likely happened while patient was at a rehab facility. Wife who was with patient today feels as if the increased dose has helped patient to sleep better at night. Patient has had other medications (amlodipine and tamsulosin) discontinued previously to decrease fall risk. Recommended to wife to monitor responses to medication changes (ADR, efficacy), dose can be reassessed.     David Pillai, PharmD, [facility]

## 2018-04-30 NOTE — PROGRESS NOTES
Met with patient and his wife today after office visit. Patient saw pulmonology. He will have a repeat CT scan of his head 5/21 for follow up on the SDH. He is in the [facility] today. [facility] PharmD Landon Javed met with the patient and wife today as well to review medications. Dr. Rosa Isela Barajas will leave medications as is for now. He will see patient prn. Goals      Patient will work with PT to increase strength/balance/mobility (pt-stated)            4/23/18: Patient is back home from [facility] rehab. [facility] PT/OT has been ordered. He is mostly WC bound, but still gets up without asking for assistance. His wife states a goal for him to progress back to ambulating with the walker. This may not be a feasible goal. -SRW    2/20/18: Patient back at [facility]. [facility]  PT/OT/ST to work with patient. WC bound and able to self propel. Full range of motion in all extremities.  -SRW

## 2018-04-30 NOTE — PROGRESS NOTES
Patient was admitted to [address] [place] [address] 3/7-3/16 for GLF with SDH and hand injury, also had injfected R elbow with hardware removal. He was initially admitted to [facility] and transferred to [address] [place] [address] on 3/12.    Subjective: Had two falls with subdurals. He's being followed by the neurosurgeons. He has repeat CT scan on May 21st.  He's had one slight fall since being home . He's pretty much confined to a wheelchair and can use a walker only when someone else is assisting him or using a belt to hold on to him. He has PT at home now through an agency with a skilled nurse. He's made some progress there. He has recovered from the surgical debridement of his right elbow and removal of hardware. Still has a little bit of drainage. The orthopedic doctors feel that this drainage will continue. He's had gradual titration of his Seroquel dose and I did ask the PharmD to look at that. While in rehab they bumped the dose up. His wife is happy that he takes 75 at bedtime because he sleeps well. He occasionally gets a little antsy in the afternoon. He is taking 20 of Citalopram and has been on that drug for more than five years. He's off blood pressure meds. He's off Flomax and drugs that will increase his fall risk. Physical Examination:  He sits in a chair. He's confused, but pleasant. Slight bruise on right forehead. His blood pressure was normal.  Heart rhythm normal.  Lungs clear. Plan:  1. His COPD seems controlled with the Symbicort. 2. The dementia with intermittent agitation. He's continuing to take Namenda and Aricept per neurology. 3. He's continued to use the Seroquel with warnings for increased cardiovascular risk in the elderly. He understands that. The wife understands that. I did refill 75 mg at bedtime. I did suggest he could titrate the dose down or she could titrate the dose down and she will consider that. Will make no other changes.   4. I suggested Henrine Katya once home PT is over and that is a possibility. Patient was discharged to [facility] for rehab.       The rehab physician discharge summary was not available at the time of outreach. Patient's wife was contacted same day of discharge.            Top Challenges reviewed with the provider   Frequent Falls: pt fell during phone call today, now has a personal aide daily during the daytime to assist with ADLs, home health PT/OT ordered             Vitals:    04/30/18 1614   BP: 126/74   Pulse: 60   Resp: 16   Temp: 98.4 °F (36.9 °C)   TempSrc: Oral   SpO2: 97%   Weight: 137 lb (62.1 kg)   Height: 5' 10\" (1.778 m)     1. Subdural hematoma due to concussion, without loss of consciousness, initial encounter [facility]  5/21  Repeat CT    2. Chronic obstructive pulmonary disease, unspecified COPD type ([facility]  Stable saw pulmonary    3. Dementia associated with other underlying disease with behavioral disturbance  Decline slowly    4.  Repeated falls  Confine wheelchair for most part

## 2018-04-30 NOTE — PROGRESS NOTES
Coordination of Care Questions    1. Have you been to the ER, urgent care clinic since your last visit? No       Hospitalized since your last visit? No    2. Have you seen or consulted any other health care providers outside of the [address] [place] [address] since your last visit? Include any pap smears or colon screening.  No

## 2018-05-15 DIAGNOSIS — F02.818 DEMENTIA ASSOCIATED WITH OTHER UNDERLYING DISEASE WITH BEHAVIORAL DISTURBANCE: ICD-10-CM

## 2018-05-15 RX ORDER — MEMANTINE HYDROCHLORIDE 10 MG/1
10 TABLET ORAL DAILY
Qty: 180 TAB | Refills: 3 | Status: SHIPPED | OUTPATIENT
Start: 2018-05-15

## 2018-05-15 RX ORDER — DONEPEZIL HYDROCHLORIDE 10 MG/1
10 TABLET, FILM COATED ORAL
Qty: 90 TAB | Refills: 3 | Status: SHIPPED | OUTPATIENT
Start: 2018-05-15

## 2018-05-15 NOTE — TELEPHONE ENCOUNTER
[facility]) called to report that pt had a fall this morning and had abrasion above his left eye.  Best contact [phone]              From answering service

## 2018-05-15 NOTE — TELEPHONE ENCOUNTER
Writer called and spoke with Jenny Recinos to give verbal order for antibiotic ointment with non stick dressing, Jenny Recinos verbalized understanding.

## 2018-05-15 NOTE — TELEPHONE ENCOUNTER
The patient's wife Puneet Edmonds is requesting that the doctor calls in refills for Rx Donepezil 10mg and Rx Memantine 10mg.  ([facility]) [phone] ([phone]

## 2018-05-15 NOTE — TELEPHONE ENCOUNTER
Requesting wound care orders,suggesting triple antibiotic ointment and dry dressing.        Yina Santacruz [phone]

## 2018-05-23 NOTE — PROGRESS NOTES
Patient has graduated from the Transitions of Care Coordination  program on 5/23/18. Patient's symptoms are stable at this time. Patient/family has the ability to self-manage. Care management goals have been completed at this time. No further nurse navigator follow up scheduled. Goals Addressed             Most Recent     COMPLETED: Patient will work with PT to increase strength/balance/mobility (pt-stated)   Worsening (1/29/2018)             5/23/18: Pt saw Dr. Leida Stephens on 4/30. He discussed referring pt to [facility] once home health discharges pt. PharmD reviewed medications and suggested the possibility of having wife titrate Seroquel down. No further recommendations at this time. -SRW    4/23/18: Patient is back home from [facility] rehab. [facility] PT/OT has been ordered. He is mostly WC bound, but still gets up without asking for assistance. His wife states a goal for him to progress back to ambulating with the walker. This may not be a feasible goal. -SRW    2/20/18: Patient back at [facility]. [facility] PT/OT/ST to work with patient. WC bound and able to self propel. Full range of motion in all extremities. -SRW              Pt has nurse navigator's contact information for any further questions, concerns, or needs. Patients upcoming visits:  No future appointments.

## 2018-06-19 NOTE — PROGRESS NOTES
Chief Complaint   Patient presents with    Elbow Swelling     Last surgery 3/2018 DR Josue Mantilla ortho suggested leaving it alone at his last visit    Has enlarged someSubjective:  He's had multiple procedures in his right elbow, initially hardware and then removal of hardware by a different surgeon. He developed an olecranon bursa swelling. The surgeon told him when last seen a month ago to wear a pad as it sometimes leaks, it still leaks. They wondered what else to do about it. It is not painful, just annoying. Physical Examination:  His exam shows reduced ROM of the right arm. Triceps muscle has been severed, so he has loss of function. There is an olecranon bursa that is swollen, it is not tender, not warm or hot. There is a very tiny pinhole and a little tiny small amount of drainage. Plan:  Because of all the hardware issues and surgery I opted not to drain this elbow. I said they could go back to orthopedics and see if they want to drain it. They could just rest it. I did tell them to get an elbow protector to try. Vitals:    06/19/18 1428   BP: (!) 143/91   Pulse: 81   Resp: 16   Temp: 98.2 °F (36.8 °C)   TempSrc: Oral   SpO2: 97%   Weight: 142 lb 12.8 oz (64.8 kg)   Height: 5' 10\" (1.778 m)     Diagnoses and all orders for this visit:    1. Olecranon bursitis of right elbow    2.  Early onset Alzheimer's dementia without behavioral disturbance

## 2018-06-19 NOTE — PROGRESS NOTES
Donte Rowan is a 82 y.o. male  Chief Complaint   Patient presents with    Elbow Swelling       1. Have you been to the ER, urgent care clinic since your last visit? Hospitalized since your last visit? No     2. Have you seen or consulted any other health care providers outside of the [facility] since your last visit? Include any pap smears or colon screening.   No      Visit Vitals    BP (!) 143/91    Pulse 81    Temp 98.2 °F (36.8 °C) (Oral)    Resp 16    Ht 5' 10\" (1.778 m)    Wt 142 lb 12.8 oz (64.8 kg)    SpO2 97%    BMI 20.49 kg/m2

## 2018-06-19 NOTE — MR AVS SNAPSHOT
[address] [place] [address] [address] [place] [address] 
[phone] Patient: Adelina Swartz MRN: [id number] ZLE:7/9/3583 Visit Information Date & Time Provider Department Dept. Phone Encounter #  
 6/19/2018  2:15 PM Norah Pride, [address] [place] [address] [facility] [phone] [id number] Upcoming Health Maintenance Date Due  
 GLAUCOMA SCREENING Q2Y 6/30/2015 Influenza Age 5 to Adult 8/1/2018 MEDICARE YEARLY EXAM 12/14/2018 DTaP/Tdap/Td series (2 - Td) 6/11/2023 Allergies as of 6/19/2018  Review Complete On: 6/19/2018 By: Jessica Hardwick Certified Medical Assistant Severity Noted Reaction Type Reactions Levaquin [Levofloxacin] High 04/04/2016    Other (comments) [place]'s tendonitis with rupture Ciprofloxacin Medium   Other (comments)  
 tendonitis Pcn [Penicillins]  04/08/2010    Anaphylaxis Sulfa (Sulfonamide Antibiotics)  04/08/2010    Unknown (comments) Current Immunizations  Reviewed on 12/13/2017 Name Date Influenza High Dose Vaccine PF 9/26/2017, 10/1/2016 Pneumococcal Conjugate (PCV-13) 10/1/2016 Tdap 6/11/2013 ZZZ-RETIRED (DO NOT USE) Pneumococcal Vaccine (Unspecified Type) 5/13/2010, 1/1/2003 Zoster 1/1/2009 Not reviewed this visit You Were Diagnosed With   
  
 Codes Comments Olecranon bursitis of right elbow    -  Primary ICD-10-CM: M70.21 ICD-9-CM: 726.33 Vitals BP Pulse Temp Resp Height(growth percentile) Weight(growth percentile) (!) 143/91 81 98.2 °F (36.8 °C) (Oral) 16 5' 10\" (1.778 m) 142 lb 12.8 oz (64.8 kg) SpO2 BMI Smoking Status 97% 20.49 kg/m2 Never Smoker BMI and BSA Data Body Mass Index Body Surface Area  
 20.49 kg/m 2 1.79 m 2 Preferred Pharmacy Pharmacy Name Phone [address] [place] [address] [phone] Your Updated Medication List  
  
   
 This list is accurate as of 6/19/18  2:59 PM.  Always use your most recent med list.  
  
  
  
  
 acetaminophen 325 mg tablet Commonly known as:  TYLENOL Take 650 mg by mouth every six (6) hours as needed for Pain. * albuterol 90 mcg/actuation inhaler Commonly known as:  PROVENTIL HFA, VENTOLIN HFA, PROAIR HFA Take 2 Puffs by inhalation every four (4) hours as needed. * albuterol 2.5 mg /3 mL (0.083 %) nebulizer solution Commonly known as:  PROVENTIL VENTOLIN  
3 mL by Nebulization route every four (4) hours as needed. For wheezing    **DX:J44.9**  
  
 budesonide-formoterol 160-4.5 mcg/actuation Hfaa Commonly known as:  SYMBICORT Take 2 Puffs by inhalation two (2) times a day. cholecalciferol 1,000 unit tablet Commonly known as:  VITAMIN D3 Take 1 Tab by mouth daily. citalopram 20 mg tablet Commonly known as:  Daniela Sink Take 1 Tab by mouth daily. donepezil 10 mg tablet Commonly known as:  ARICEPT Take 1 Tab by mouth nightly. 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 memantine 10 mg tablet Commonly known as:  Deidre Croft Take 1 Tab by mouth daily. 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 QUEtiapine 25 mg tablet Commonly known as:  SEROquel Take 3 Tabs by mouth nightly. * Notice: This list has 2 medication(s) that are the same as other medications prescribed for you. Read the directions carefully, and ask your doctor or other care provider to review them with you. Introducing hospitals & HEALTH SERVICES! Dear Maryam Foster: Thank you for requesting a [employer] account. Our records indicate that you already have an active [employer] account. You can access your account anytime at [email or web address] Did you know that you can access your hospital and ER discharge instructions at any time in [employer]? You can also review all of your test results from your hospital stay or ER visit. Additional Information If you have questions, please visit the Frequently Asked Questions section of the [employer] website at [email or web address] Remember, [employer] is NOT to be used for urgent needs. For medical emergencies, dial 911. Now available from your iPhone and Android! Please provide this summary of care documentation to your next provider. Your primary care clinician is listed as Wyatt Pyle. If you have any questions after today's visit, please call [phone].

## 2018-06-21 NOTE — PROGRESS NOTES
Received a call from pt's wife Maximo Hackett stating that the pt has been having difficulty swallowing and chokes when eating. He feels like food is getting stuck in his throat. She would like a recommendation from Dr. Roshni Devi to see if he should get in to see a specialist.     NN discussed with Dr. Roshni Devi. If pt is still receiving [facility] services, can order speech consult. If not, can refer to GI Dr. Liv Kohler with [facility]. Notified Mrs. Young & she confirmed that he is receiving home health skilled nursing through [facility]. Contacted [place] [address] [address] and [facility] for Clinical Supervisor Bebo Mccarthy giving verbal order for speech therapy consult. Faxed referral as well and received fax confirmation.

## 2018-06-28 NOTE — PROGRESS NOTES
Verbal order per Dr. Gama [place] for home health speech therapy eval for dysphagia d/t recent episodes of choking after eating and feeling like food is stuck in throat. Faxed referral to [facility].

## 2018-08-27 NOTE — PROGRESS NOTES
Alfonso Harris is a 82 y.o. male      Chief Complaint   Patient presents with    Sleep Problem     Pt is having hard time faliing a sleep. 1. Have you been to the ER, urgent care clinic since your last visit? Hospitalized since your last visit? No    2. Have you seen or consulted any other health care providers outside of the [facility] since your last visit? Include any pap smears or colon screening.  No

## 2018-08-27 NOTE — PROGRESS NOTES
Saw ortho last Thursday got bilat shoulder injections of steroids had severe pain till yesterday  [facility] [place] notes reviewed on care everywhere    Increased agitiation after sleeps for 2 hours  seroquel at 7:30    Has progressive dementia with 12 hour per day personal care, still does adl but night time getting worse  Current Outpatient Prescriptions   Medication Sig    atorvastatin (LIPITOR) 10 mg tablet Take 10 mg by mouth.  diclofenac (VOLTAREN) 1 % gel as needed.  hydrocortisone (HYTONE) 2.5 % topical cream as needed.  QUEtiapine (SEROQUEL) 50 mg tablet Take 2 Tabs by mouth nightly.  citalopram (CELEXA) 20 mg tablet Take 1 Tab by mouth daily.  donepezil (ARICEPT) 10 mg tablet Take 1 Tab by mouth nightly. 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  inulin (FIBER CHOICE, INULIN,) 1.5 gram chew Take 1 Tab by mouth daily.  acetaminophen (TYLENOL) 325 mg tablet Take 650 mg by mouth every six (6) hours as needed for Pain.  albuterol (PROVENTIL HFA, VENTOLIN HFA, PROAIR HFA) 90 mcg/actuation inhaler Take 2 Puffs by inhalation every four (4) hours as needed.  albuterol (PROVENTIL VENTOLIN) 2.5 mg /3 mL (0.083 %) nebulizer solution 3 mL by Nebulization route every four (4) hours as needed. For wheezing    **DX:J44.9**    budesonide-formoterol (SYMBICORT) 160-4.5 mcg/actuation HFAA Take 2 Puffs by inhalation two (2) times a day.  cholecalciferol (VITAMIN D3) 1,000 unit tablet Take 1 Tab by mouth daily. No current facility-administered medications for this visit.           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 Sundowning  Try 100 mg seroquel    2. Dysthymia  Continue ssri  - citalopram (CELEXA) 20 mg tablet; Take 1 Tab by mouth daily. Dispense: 30 Tab; Refill: 5    3. Dementia associated with other underlying disease with behavioral disturbance  progressing    4. High risk medication use  Black box warning  reviewed    5.  Bilateral shoulder bursitis  Seems better  Advise no invasive testing like MRI      Prolonged visit with 15 minutes of time out  of more than a  25 minute visit spent counseling patient and family and formulation of care

## 2018-09-04 NOTE — TELEPHONE ENCOUNTER
Message from wife still having sleep issues which are tough despite higher dose antipsychotic    Will add trazadone at HS and see if better

## 2018-11-14 NOTE — PROGRESS NOTES
1. Have you been to the ER, urgent care clinic since your last visit? Hospitalized since your last visit? No    2. Have you seen or consulted any other health care providers outside of the [address] [place] [address] since your last visit? Include any pap smears or colon screening. Yes.   Dr Fariha Hartley

## 2018-11-14 NOTE — PROGRESS NOTES
Increased agitiation after sleeps for 2 hours  seroquel at 7:30  They have used only trazadone with mixed results last night gave xanax too  Has been off seroquel    Has progressive dementia with 12 hour per day personal care, still does adl but night time getting worse  Eats well    Result Date: 2018  [address] [facility] Name: Serge Phillips Phys: [id number] B [facility] : 1935 Age: 82 Sex: M [phone] Acct: [de-identified] Loc: D.CMRI Phone #: [phone] Exam Date: 2018 Status: REG CLI FAX #: [phone] Rad No: URN: [id number] Unit No: [id number] EXAMS: [id number] MRI UPPER EXT JOINT L W/O CON Reason for Visit: IMPINGEMENT SYNDROME OF LEFT SHOULDER Reason for Exam: IMPINGEMENT SYNDROME OF LT SHOULDER Left shoulder. Standard noncontrast protocol. Motion artifact limits some of the sequences. Acromiohumeral outlet: Comminuted lateral clavicle fracture. Mild malalignment at the [facility] joint, with mild inferior position of the acromion relative to the clavicle. AC joint otherwise appears intact. Suture anchor or screw is noted in the expected position of the coracoclavicular ligaments. Os acromiale versus nondisplaced acromion fracture. Concave lateral acromial undersurface. No subacromial subdeltoid bursal effusion. Rotator cuff: Assessment limited by motion artifact. No definite rotator cuff pathology is evident. No rotator cuff muscle atrophy or retraction. Biceps long head tendon: Normally positioned with normal appearance. Labrum: No overt tear is evident. Articular cartilage: No high-grade defect. Marrow: No concerning finding. Visualized portion of the deltoid muscle is intact. Impression: 1. Limited by motion artifact. 2. Prior comminuted lateral clavicle fracture and surgery. Adjacent AC joint appears to be intact, although minimally malaligned 3. Os acromiale versus nondisplaced acromion fracture.  4. No definite rotator cuff pathology or shoulder joint internal derangement is evident. ** Electronically Signed by Connie Shrestha MD on 2018 at 0 ** Reported and signed by: Connie Shrestha MD PAGE 1 Signed Report (CONTINUED) [address] [facility] Name: Nellie Phillips Phys: [id number] B [facility] : 1935 Age: 82 Sex: M [phone] Acct: [de-identified] Loc: D.CMRI Phone #: [phone] Exam Date: 2018 Status: REG CLI FAX #: [phone] Rad No: URN: [id number] Unit No: [id number] EXAMS: [id number] MRI UPPER EXT JOINT L W/O CON <Continued> CC: Cat Crowley MD Dictated Date/Time: 2018 ([phone]) Techs: Jericho Hernandez,(R)(CT); Sunita Pritchett Transcribed Date/Time: 2018 (0490) : Natalie Heard Print D/T: S: 2018 (1535) BATCH NO: N/A PAGE 2             Current Outpatient Medications   Medication Sig    QUEtiapine (SEROQUEL) 50 mg tablet 25 mg or 50 mg at HS    VENTOLIN HFA 90 mcg/actuation inhaler INHALE 2 PUFFS BY MOUTH EVERY 4 HOURS AS NEEDED    traZODone (DESYREL) 100 mg tablet Take 1 Tab by mouth nightly.  atorvastatin (LIPITOR) 10 mg tablet Take 10 mg by mouth.  diclofenac (VOLTAREN) 1 % gel as needed.  citalopram (CELEXA) 20 mg tablet Take 1 Tab by mouth daily.  donepezil (ARICEPT) 10 mg tablet Take 1 Tab by mouth nightly. 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  inulin (FIBER CHOICE, INULIN,) 1.5 gram chew Take 1 Tab by mouth daily.  acetaminophen (TYLENOL) 325 mg tablet Take 650 mg by mouth every six (6) hours as needed for Pain.  albuterol (PROVENTIL HFA, VENTOLIN HFA, PROAIR HFA) 90 mcg/actuation inhaler Take 2 Puffs by inhalation every four (4) hours as needed.     albuterol (PROVENTIL VENTOLIN) 2.5 mg /3 mL (0.083 %) nebulizer solution 3 mL by Nebulization route every four (4) hours as needed. For wheezing    **DX:J44.9**    budesonide-formoterol (SYMBICORT) 160-4.5 mcg/actuation HFAA Take 2 Puffs by inhalation two (2) times a day.  cholecalciferol (VITAMIN D3) 1,000 unit tablet Take 1 Tab by mouth daily.  hydrocortisone (HYTONE) 2.5 % topical cream as needed. No current facility-administered medications for this visit. Patient Active Problem List    Diagnosis    Dysthymia    Depression     Seeing DR [facility]ubanks [facility] psychiatry      Osteopenia    BPH (benign prostatic hyperplasia)    Personal history of colon cancer, stage III     1 positive node      chemorx for 6 months      GÓMEZ (mycobacterium avium-intracellulare) ([facility]    CAD (coronary artery disease)    COPD (chronic obstructive pulmonary disease) ([facility])     Asthma        Melanoma ([facility]     Vitals:    11/14/18 1338   BP: (!) 171/96   Pulse: 65   Resp: 18   Temp: 96.1 °F (35.6 °C)   TempSrc: Oral   SpO2: 96%   Weight: 141 lb (64 kg)   Height: 5' 10\" (1.778 m)   BP witjh pain  rom shoulder  Left reduced right normal cannot lift left arm over head  c spine rom reduced  Alert but confused and poor safety awareness  Wheelchair bound for the most part        1. Sundowning  Add 25 to 50 serroquel to trazadone  100 mg    Topical hemp ordered and they will try soon  Discussed palliative injectiions or accupuncture    2. Dysthymia  Continue ssri  - citalopram (CELEXA) 20 mg tablet; Take 1 Tab by mouth daily. Dispense: 30 Tab; Refill: 5    3. Dementia associated with other underlying disease with behavioral disturbance  Progressing  On drugs    4. High risk medication use  Black box warning  reviewed    5.  Bilateral shoulder bursitis  Seems better  Advise no invasive testing like MRI  At this point    Prolonged visit with 15 minutes of time out  of more than a  25 minute visit spent counseling patient and family and formulation of care    Diagnoses and all orders for this visit:    1. 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 Sundowning    3.  Left shoulder pain, unspecified chronicity    Other orders  -     QUEtiapine (SEROQUEL) 50 mg tablet; 25 mg or 50 mg at HS      Continue celexa  Prn use xanax  Avoid narcotics if poss or  Do chronic pain with urine and contract

## 2019-01-01 ENCOUNTER — APPOINTMENT (OUTPATIENT)
Dept: GENERAL RADIOLOGY | Age: 84
End: 2019-01-01
Attending: NURSE PRACTITIONER
Payer: MEDICARE

## 2019-01-01 ENCOUNTER — OFFICE VISIT (OUTPATIENT)
Dept: INTERNAL MEDICINE CLINIC | Age: 84
End: 2019-01-01

## 2019-01-01 ENCOUNTER — TELEPHONE (OUTPATIENT)
Dept: INTERNAL MEDICINE CLINIC | Age: 84
End: 2019-01-01

## 2019-01-01 ENCOUNTER — HOSPITAL ENCOUNTER (EMERGENCY)
Age: 84
Discharge: HOME | End: 2019-03-29
Attending: EMERGENCY MEDICINE | Admitting: EMERGENCY MEDICINE
Payer: MEDICARE

## 2019-01-01 ENCOUNTER — APPOINTMENT (OUTPATIENT)
Dept: CT IMAGING | Age: 84
End: 2019-01-01
Attending: EMERGENCY MEDICINE
Payer: MEDICARE

## 2019-01-01 VITALS
OXYGEN SATURATION: 100 % | DIASTOLIC BLOOD PRESSURE: 95 MMHG | SYSTOLIC BLOOD PRESSURE: 153 MMHG | HEART RATE: 88 BPM | HEIGHT: 70 IN | BODY MASS INDEX: 18.61 KG/M2 | WEIGHT: 130 LBS | TEMPERATURE: 97.5 F | RESPIRATION RATE: 16 BRPM

## 2019-01-01 VITALS
DIASTOLIC BLOOD PRESSURE: 82 MMHG | HEART RATE: 64 BPM | TEMPERATURE: 96.4 F | WEIGHT: 134 LBS | SYSTOLIC BLOOD PRESSURE: 138 MMHG | RESPIRATION RATE: 18 BRPM | BODY MASS INDEX: 19.18 KG/M2 | OXYGEN SATURATION: 100 % | HEIGHT: 70 IN

## 2019-01-01 DIAGNOSIS — M75.52 BILATERAL SHOULDER BURSITIS: ICD-10-CM

## 2019-01-01 DIAGNOSIS — M75.51 BILATERAL SHOULDER BURSITIS: ICD-10-CM

## 2019-01-01 DIAGNOSIS — F03.91 DEMENTIA WITH BEHAVIORAL DISTURBANCE, UNSPECIFIED DEMENTIA TYPE: ICD-10-CM

## 2019-01-01 DIAGNOSIS — M25.519 CHRONIC SHOULDER PAIN, UNSPECIFIED LATERALITY: Primary | ICD-10-CM

## 2019-01-01 DIAGNOSIS — R13.10 DYSPHAGIA, UNSPECIFIED TYPE: Primary | ICD-10-CM

## 2019-01-01 DIAGNOSIS — Z79.899 HIGH RISK MEDICATION USE: ICD-10-CM

## 2019-01-01 DIAGNOSIS — G89.29 CHRONIC SHOULDER PAIN, UNSPECIFIED LATERALITY: Primary | ICD-10-CM

## 2019-01-01 DIAGNOSIS — J44.9 CHRONIC OBSTRUCTIVE PULMONARY DISEASE, UNSPECIFIED COPD TYPE (HCC): ICD-10-CM

## 2019-01-01 DIAGNOSIS — R63.4 WEIGHT LOSS, ABNORMAL: ICD-10-CM

## 2019-01-01 DIAGNOSIS — Z00.00 MEDICARE ANNUAL WELLNESS VISIT, SUBSEQUENT: Primary | ICD-10-CM

## 2019-01-01 LAB
ANION GAP SERPL CALC-SCNC: 5 MMOL/L (ref 5–15)
BASOPHILS # BLD: 0 K/UL (ref 0–0.1)
BASOPHILS NFR BLD: 0 % (ref 0–1)
BUN SERPL-MCNC: 14 MG/DL (ref 6–20)
BUN/CREAT SERPL: 19 (ref 12–20)
CALCIUM SERPL-MCNC: 8.3 MG/DL (ref 8.5–10.1)
CHLORIDE SERPL-SCNC: 106 MMOL/L (ref 97–108)
CO2 SERPL-SCNC: 29 MMOL/L (ref 21–32)
COMMENT, HOLDF: NORMAL
CREAT SERPL-MCNC: 0.73 MG/DL (ref 0.7–1.3)
DIFFERENTIAL METHOD BLD: ABNORMAL
EOSINOPHIL # BLD: 0 K/UL (ref 0–0.4)
EOSINOPHIL NFR BLD: 0 % (ref 0–7)
ERYTHROCYTE [DISTWIDTH] IN BLOOD BY AUTOMATED COUNT: 14 % (ref 11.5–14.5)
GLUCOSE SERPL-MCNC: 94 MG/DL (ref 65–100)
HCT VFR BLD AUTO: 42.5 % (ref 36.6–50.3)
HGB BLD-MCNC: 13.7 G/DL (ref 12.1–17)
IMM GRANULOCYTES # BLD AUTO: 0 K/UL (ref 0–0.04)
IMM GRANULOCYTES NFR BLD AUTO: 0 % (ref 0–0.5)
LYMPHOCYTES # BLD: 0.6 K/UL (ref 0.8–3.5)
LYMPHOCYTES NFR BLD: 4 % (ref 12–49)
MCH RBC QN AUTO: 32.4 PG (ref 26–34)
MCHC RBC AUTO-ENTMCNC: 32.2 G/DL (ref 30–36.5)
MCV RBC AUTO: 100.5 FL (ref 80–99)
MONOCYTES # BLD: 0.7 K/UL (ref 0–1)
MONOCYTES NFR BLD: 5 % (ref 5–13)
NEUTS SEG # BLD: 13.2 K/UL (ref 1.8–8)
NEUTS SEG NFR BLD: 91 % (ref 32–75)
NRBC # BLD: 0 K/UL (ref 0–0.01)
NRBC BLD-RTO: 0 PER 100 WBC
PLATELET # BLD AUTO: 494 K/UL (ref 150–400)
PMV BLD AUTO: 8.8 FL (ref 8.9–12.9)
POTASSIUM SERPL-SCNC: 4.2 MMOL/L (ref 3.5–5.1)
RBC # BLD AUTO: 4.23 M/UL (ref 4.1–5.7)
RBC MORPH BLD: ABNORMAL
SAMPLES BEING HELD,HOLD: NORMAL
SODIUM SERPL-SCNC: 140 MMOL/L (ref 136–145)
WBC # BLD AUTO: 14.5 K/UL (ref 4.1–11.1)

## 2019-01-01 PROCEDURE — 71045 X-RAY EXAM CHEST 1 VIEW: CPT

## 2019-01-01 PROCEDURE — 70491 CT SOFT TISSUE NECK W/DYE: CPT

## 2019-01-01 PROCEDURE — 80048 BASIC METABOLIC PNL TOTAL CA: CPT

## 2019-01-01 PROCEDURE — 96361 HYDRATE IV INFUSION ADD-ON: CPT

## 2019-01-01 PROCEDURE — 74011250636 HC RX REV CODE- 250/636: Performed by: EMERGENCY MEDICINE

## 2019-01-01 PROCEDURE — 99283 EMERGENCY DEPT VISIT LOW MDM: CPT

## 2019-01-01 PROCEDURE — 36415 COLL VENOUS BLD VENIPUNCTURE: CPT

## 2019-01-01 PROCEDURE — 74011250637 HC RX REV CODE- 250/637: Performed by: NURSE PRACTITIONER

## 2019-01-01 PROCEDURE — 85025 COMPLETE CBC W/AUTO DIFF WBC: CPT

## 2019-01-01 PROCEDURE — 74011636320 HC RX REV CODE- 636/320: Performed by: RADIOLOGY

## 2019-01-01 PROCEDURE — 94760 N-INVAS EAR/PLS OXIMETRY 1: CPT

## 2019-01-01 PROCEDURE — 74011250636 HC RX REV CODE- 250/636: Performed by: NURSE PRACTITIONER

## 2019-01-01 PROCEDURE — 96360 HYDRATION IV INFUSION INIT: CPT

## 2019-01-01 PROCEDURE — 70450 CT HEAD/BRAIN W/O DYE: CPT

## 2019-01-01 RX ORDER — SCOLOPAMINE TRANSDERMAL SYSTEM 1 MG/1
1 PATCH, EXTENDED RELEASE TRANSDERMAL
Qty: 12 PATCH | Refills: 0 | Status: SHIPPED | OUTPATIENT
Start: 2019-01-01

## 2019-01-01 RX ORDER — TRAMADOL HYDROCHLORIDE 50 MG/1
50 TABLET ORAL 2 TIMES DAILY
Qty: 60 TAB | Refills: 2 | Status: SHIPPED | OUTPATIENT
Start: 2019-01-01 | End: 2019-01-01

## 2019-01-01 RX ORDER — SCOLOPAMINE TRANSDERMAL SYSTEM 1 MG/1
1 PATCH, EXTENDED RELEASE TRANSDERMAL
Status: DISCONTINUED | OUTPATIENT
Start: 2019-01-01 | End: 2019-01-01 | Stop reason: HOSPADM

## 2019-01-01 RX ORDER — MORPHINE SULFATE 4 MG/ML
1 INJECTION INTRAVENOUS
Status: DISCONTINUED | OUTPATIENT
Start: 2019-01-01 | End: 2019-01-01

## 2019-01-01 RX ORDER — ONDANSETRON 2 MG/ML
4 INJECTION INTRAMUSCULAR; INTRAVENOUS
Status: DISCONTINUED | OUTPATIENT
Start: 2019-01-01 | End: 2019-01-01

## 2019-01-01 RX ADMIN — SODIUM CHLORIDE 1000 ML: 900 INJECTION, SOLUTION INTRAVENOUS at 16:42

## 2019-01-01 RX ADMIN — IOPAMIDOL 100 ML: 612 INJECTION, SOLUTION INTRAVENOUS at 17:00

## 2019-02-21 NOTE — PROGRESS NOTES
This is the Subsequent Medicare Annual Wellness Exam, performed 12 months or more after the Initial AWV or the last Subsequent AWV I have reviewed the patient's medical history in detail and updated the computerized patient record. History Past Medical History:  
Diagnosis Date  Colon cancer ([facility]  COPD  Depression  HTN (hypertension) 4/8/2010  Hypercholesterolemia  Ill-defined condition Dementia - stage II  
 GÓMEZ (mycobacterium avium-intracellulare) ([facility] 4/8/2010  Melanoma ([facility] 4/8/2010  BRYANT on CPAP 5/9/2013  Osteopenia 11/13/2013 Past Surgical History:  
Procedure Laterality Date  ENDOSCOPY, COLON, DIAGNOSTIC    
 HX ABDOMINAL WALL DEFECT REPAIR  2011  
 colon cancer  HX CHOLECYSTECTOMY  HX COLONOSCOPY    
 HX ORTHOPAEDIC Current Outpatient Medications Medication Sig Dispense Refill  QUEtiapine (SEROQUEL) 50 mg tablet 25 mg or 50 mg at HS 60 Tab 2  
 VENTOLIN HFA 90 mcg/actuation inhaler INHALE 2 PUFFS BY MOUTH EVERY 4 HOURS AS NEEDED 1 Inhaler 11  
 traZODone (DESYREL) 100 mg tablet Take 1 Tab by mouth nightly. 30 Tab 5  
 atorvastatin (LIPITOR) 10 mg tablet Take 10 mg by mouth.  diclofenac (VOLTAREN) 1 % gel as needed.  hydrocortisone (HYTONE) 2.5 % topical cream as needed.  citalopram (CELEXA) 20 mg tablet Take 1 Tab by mouth daily. 30 Tab 5  
 donepezil (ARICEPT) 10 mg tablet Take 1 Tab by mouth nightly. 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  
 memantine (NAMENDA) 10 mg tablet Take 1 Tab by mouth daily. 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  inulin (FIBER CHOICE, INULIN,) 1.5 gram chew Take 1 Tab by mouth daily.  acetaminophen (TYLENOL) 325 mg tablet Take 650 mg by mouth every six (6) hours as needed for Pain.  albuterol (PROVENTIL HFA, VENTOLIN HFA, PROAIR HFA) 90 mcg/actuation inhaler Take 2 Puffs by inhalation every four (4) hours as needed. 1 Inhaler 11  
 albuterol (PROVENTIL VENTOLIN) 2.5 mg /3 mL (0.083 %) nebulizer solution 3 mL by Nebulization route every four (4) hours as needed. For wheezing    **DX:J44.9** 1 Package 5  
 budesonide-formoterol (SYMBICORT) 160-4.5 mcg/actuation HFAA Take 2 Puffs by inhalation two (2) times a day. 1 Inhaler 5  cholecalciferol (VITAMIN D3) 1,000 unit tablet Take 1 Tab by mouth daily. 30 Tab 5 Allergies Allergen Reactions  Levaquin [Levofloxacin] Other (comments) Vinh's tendonitis with rupture  Ciprofloxacin Other (comments)  
  tendonitis  Pcn [Penicillins] Anaphylaxis  Sulfa (Sulfonamide Antibiotics) Unknown (comments) Family History Problem Relation Age of Onset  Arthritis-rheumatoid Mother  Cancer Mother  Stroke Mother Social History Tobacco Use  Smoking status: Never Smoker  Smokeless tobacco: Never Used Substance Use Topics  Alcohol use: Yes Alcohol/week: 0.0 oz  
  Comment: rarely Patient Active Problem List  
Diagnosis Code  GÓMEZ (mycobacterium avium-intracellulare) ([facility]) A31.0  
 CAD (coronary artery disease) I25.10  COPD (chronic obstructive pulmonary disease) ([facility]) J44.9  Melanoma ([facility] C43.9  Personal history of colon cancer, stage III Z85.038  
 BPH (benign prostatic hyperplasia) N40.0  Osteopenia M85.80  Depression F32.9  Dysthymia F34.1 Depression Risk Factor Screening:  
 
3 most recent PHQ Screens 4/3/2014 PHQ Not Done Patient Decline Little interest or pleasure in doing things Not at all Feeling down, depressed, irritable, or hopeless Not at all Total Score PHQ 2 0 Alcohol Risk Factor Screening:  
 
 
Functional Ability and Level of Safety:  
Hearing Loss The patient needs further evaluation. Activities of Daily Living The home contains: handrails, grab bars and wheelchair Patient needs help with:  transportation, shopping, preparing meals, laundry, housework, managing medications, managing money, bathing, hygiene, bathroom needs and walking Fall Risk Fall Risk Assessment, last 12 mths 4/30/2018 Able to walk? Yes Fall in past 12 months? Yes Fall with injury? Yes  
Number of falls in past 12 months 8 or more Fall Risk Score 9 Abuse Screen Patient is not abused Cognitive Screening Evaluation of Cognitive Function: 
Has your family/caregiver stated any concerns about your memory: yes Normal, Abnormal 
 
Patient Care Team  
Patient Care Team: 
Clement Reynoso MD as PCP - General 
Janina Massed Gerhardt King, MD (Pulmonary Disease) Rene Rees MD (Orthopedic Surgery) Alexandre Luis MD (Otolaryngology) Purnima Carpenter MD (Neurology) Assessment/Plan Education and counseling provided: 
Are appropriate based on today's review and evaluation End-of-Life planning (with patient's consent) Diagnoses and all orders for this visit: 
 
1. Medicare annual wellness visit, subsequent Health Maintenance Due Topic Date Due  Shingrix Vaccine Age 50> (1 of 2) 02/01/1987  GLAUCOMA SCREENING Q2Y  06/30/2015  MEDICARE YEARLY EXAM  12/14/2018 Subjective:  He is followed for dementia, COPD, and multiple fractures from falls, severe ataxia. He is now in a wheelchair and is happy. He does not try to walk unless he is with someone strong who can help him. He is needing more and more help with his ADLs. Unfortunately his wife, who is his major caregiver, is having some open heart surgery procedure next week and she has decided to move him to [place] for acute care, but this will be a long term move. His agitation is pretty well controlled with 100 of Seroquel. He continues on a little bit low dose of Namenda and is on Aricept. He does see neurology, pulmonary and me.   He has had no further falls. He has had no ER visits. He has had no flare of his COPD since last fall. When his COPD flares he uses his nebulizer treatment a couple of times and takes a tapering course of prednisone and usually gets cleared up. He is less communicative than he used to be. He understands that he is moving. He thinks he is coming back home after his wife recovers from the surgery, however that is probably not the case. According to the wife he has been fairly stable. It is sad to see him decline. His breathing is still stable. He has not been anxious and depression has been stable. He gets occasional doses of Tylenol for pain. He has pain in his upper shoulder and he gets occasional Xanax 0.25 for agitated behavior. This is only once every two or three weeks. Physical Examination:  His blood pressure was normal.  His lungs were clear, although diminished breath sounds. S1, S2 regular. He was in a wheelchair. He was alert, but disoriented. His speech was quiet and he did not say that much. He may have been a little sedated from his Seroquel. Plan: 1. I will not change his meds. 2. I will fill out all the forms for him to move. 3. Tylenol will be used for pain. 4. He will use Xanax as needed for agitation. I do not think any stronger pain medicine is needed. I would recommend that he continue with his other meds the same. 5. He is moving into a memory care unit, which is appropriate, he will be safe in that situation. Long discussion with the wife, long discussion with him and all the forms were filled out. Vitals:  
 02/21/19 1036 BP: 138/82 Pulse: 64 Resp: 18 Temp: 96.4 °F (35.8 °C) TempSrc: Oral  
SpO2: 100% Weight: 134 lb (60.8 kg) Height: 5' 10\" (1.778 m) All meds reordered Diagnoses and all orders for this visit: 
 
1. Medicare annual wellness visit, subsequent 2. Dementia with behavioral disturbance, unspecified dementia type 3. Chronic obstructive pulmonary disease, unspecified COPD type ([facility] 4. High risk medication use 5. Bilateral shoulder bursitis 6. Weight loss, abnormal 
 
 
Tylenol 1-2 tabs po q4h prn Xanax rare use Appro[priate memory care unit High risk medications reviewed Forms completed for patient at time of visit to expedite their care

## 2019-02-21 NOTE — PATIENT INSTRUCTIONS
Medicare Wellness Visit, Male The best way to live healthy is to have a lifestyle where you eat a well-balanced diet, exercise regularly, limit alcohol use, and quit all forms of tobacco/nicotine, if applicable. Regular preventive services are another way to keep healthy. Preventive services (vaccines, screening tests, monitoring & exams) can help personalize your care plan, which helps you manage your own care. Screening tests can find health problems at the earliest stages, when they are easiest to treat. [facility] follows the current, evidence-based guidelines published by the [facility] ([facility]) when recommending preventive services for our patients. Because we follow these guidelines, sometimes recommendations change over time as research supports it. (For example, a prostate screening blood test is no longer routinely recommended for men with no symptoms.) Of course, you and your doctor may decide to screen more often for some diseases, based on your risk and co-morbidities (chronic disease you are already diagnosed with). Preventive services for you include: - Medicare offers their members a free annual wellness visit, which is time for you and your primary care provider to discuss and plan for your preventive service needs. Take advantage of this benefit every year! 
-All adults over age 72 should receive the recommended pneumonia vaccines. Current USPSTF guidelines recommend a series of two vaccines for the best pneumonia protection.  
-All adults should have a flu vaccine yearly and an ECG.  All adults age 61 and older should receive a shingles vaccine once in their lifetime.   
-All adults age 38-68 who are overweight should have a diabetes screening test once every three years.  
-Other screening tests & preventive services for persons with diabetes include: an eye exam to screen for diabetic retinopathy, a kidney function test, a foot exam, and stricter control over your cholesterol.  
-Cardiovascular screening for adults with routine risk involves an electrocardiogram (ECG) at intervals determined by the provider.  
-Colorectal cancer screening should be done for adults age 54-65 with no increased risk factors for colorectal cancer. There are a number of acceptable methods of screening for this type of cancer. Each test has its own benefits and drawbacks. Discuss with your provider what is most appropriate for you during your annual wellness visit. The different tests include: colonoscopy (considered the best screening method), a fecal occult blood test, a fecal DNA test, and sigmoidoscopy. 
-All adults born between [facility] should be screened once for Hepatitis C. 
-An Abdominal Aortic Aneurysm (AAA) Screening is recommended for men age 73-68 who has ever smoked in their lifetime. Here is a list of your current Health Maintenance items (your personalized list of preventive services) with a due date: 
Health Maintenance Due Topic Date Due  Shingles Vaccine (1 of 2) 02/01/1987  Glaucoma Screening   06/30/2015 [facility] Annual Well Visit  12/14/2018

## 2019-02-21 NOTE — PROGRESS NOTES
1. Have you been to the ER, urgent care clinic since your last visit? Hospitalized since your last visit? No 
 
2. Have you seen or consulted any other health care providers outside of the [address] [place] [address] since your last visit? Include any pap smears or colon screening.  No

## 2019-02-22 NOTE — TELEPHONE ENCOUNTER
Verbal order given to Newton Connelly LPN with [facility] for the Advil. [facility] also requested clarification on several other medications. Mayra Clubb states the wife brought in Mucinex 400mg and is giving it to the patient twice daily. Dr. Violetta Choi wrote orders for Mucniex 600mg twice daily. Also needs clarification on Tylenol. Patient's wife has been giving him 650mg tablets but Dr. Violetta Choi wrote orders for Tylenol 1000mg three times daily as needed.

## 2019-02-22 NOTE — TELEPHONE ENCOUNTER
Verbal order given to SCAR with medication instructions. SCAR states with the Tylenol it can not be 1 or 2 tablets it has to be specific tablet instruction. SCAR states patient already had 2 Tylenol tablets today. Verbal ok for Tylenol 650mg 2 tablets by mouth three times daily as needed. Is this ok Dr. John Christianson?

## 2019-02-22 NOTE — TELEPHONE ENCOUNTER
[place] calling to clarify medication and a verbal order for advil. This is his first day at assisted living facility.

## 2019-02-27 NOTE — TELEPHONE ENCOUNTER
Left message at [facility] that Dr Gama [place] did not order Tramadol for patient. Only order tylenol.

## 2019-02-28 NOTE — TELEPHONE ENCOUNTER
Agnes Yeung says he has fallen 7 times in one week. Requesting order for PT,OT,speech for choking. Family friend Dr. Farideh Goodson ordered the Tramadol over the weekend because he was excruciating pain. This has helped him tremendously.             Phone number [phone]  Ask for [facility]    Fax [phone]

## 2019-03-01 NOTE — TELEPHONE ENCOUNTER
Pt fell on 3/1/19. Jacquelyn Recinos is the occupational therapist and her evaluation of Mr. Young Boudreaux is: the patient is unable to track outside of midline. He's able to track L-R but not up and down (top/bottom quadrant). Jacquelyn Recinos with OT was inquiring if he needed a neurology consult.

## 2019-03-29 NOTE — ED PROVIDER NOTES
82 y.o. male with past medical history significant for HTN,  melanoma, hypercholesterolemia, COPD, colon CA (had abdominal surgery for it), BRYANT (on CPAP), osteopenia, depression, and dementia (stage II) presents in wheelchair from [facility] with chief complaint of difficulty swallowing that began 1 month ago, but worsened this morning. Spouse explains that the pt went from having difficulty swallowing to being completely unable to swallow today, adding that he is spitting up what he cannot swallow. Pt spouse reports that the pt's care facility changed his diet recently to \"softer foods\" secondary to difficulty swallowing. Spouse reports that the pt had similar symptoms a couple of years ago, where he had an endoscopy that was normal. Pt and family deny any speech changes or pain at this time. There are no other acute medical concerns at this time. Social hx: Patient denies Tobacco use. Reports rare EtOH use. Denies illicit drug abuse. PCP: Baron Chela MD 
 
I have evaluated the patient as the Provider in Triage. I have reviewed His vital signs and the triage nurse assessment. I have talked with the patient and any available family and advised that I am the provider in triage and have ordered the appropriate study to initiate their work up based on the clinical presentation during my assessment. I have advised that the patient will be accommodated in the Main ED as soon as possible. I have also requested to contact the triage nurse or myself immediately if the patient experiences any changes in their condition during this brief waiting period. 81 yo WM presents w/ trouble swallowing. It sounds like this is fairly chronic. He's had trouble recently with swallowing. They changed his diet to a \"softer diet\" last week. That is when trouble started. Today he can't swallow. Will check basic blood work and give IVF. Will check CT scan of neck. Note written by Zina Pritchard, as dictated by Kimberli Lawton MD, 3:44 PM 
 
 
Geraldo Banks is a 82 y.o. male with Hx of end stage dementia, HTN, HLD, melanoma, COPD, colon CA, BRYANT, depression, osteopenia who presents via WC w/ SO to [facility] ED with cc of difficulty swallowing. Agree with above note. In addition, wife states that pt is actually on hospice and that is because he has end stage dementia. She reports that she felt \"concerned\" because \"he can't eat and drink. \" She reports the hospice team discussed options for care with her, however, she declined their assistance, signed the pt out of hospice, and brought him to the ED for further evaluation. Wife would like to know whether or not the pt has a food bolus and or needs an esophageal dilation as her primary concern. Wife states that she feels the pt cognitive function has made a more sharp decline and that is the reason for the pt disability in swallowing. Pt reports that he has felt more SOB today since he cannot maintain his secretions and cannot swallow. He denies any physical pain. PCP: Lavinia Faulkner MD 
 
There are no other complaints, changes or physical findings at this time. The history is provided by the patient and the spouse. No  was used. Past Medical History:  
Diagnosis Date  Colon cancer ([address]  COPD  Depression  HTN (hypertension) 4/8/2010  Hypercholesterolemia  Ill-defined condition Dementia - stage II  
 GÓMEZ (mycobacterium avium-intracellulare) [facility] 4/8/2010  Melanoma ([facility] 4/8/2010  BRYANT on CPAP 5/9/2013  Osteopenia 11/13/2013 Past Surgical History:  
Procedure Laterality Date  ENDOSCOPY, COLON, DIAGNOSTIC    
 HX ABDOMINAL WALL DEFECT REPAIR  2011  
 colon cancer  HX CHOLECYSTECTOMY  HX COLONOSCOPY    
 HX ORTHOPAEDIC Family History:  
Problem Relation Age of Onset  Arthritis-rheumatoid Mother  Cancer Mother  Stroke Mother Social History Socioeconomic History  Marital status:  Spouse name: Not on file  Number of children: Not on file  Years of education: Not on file  Highest education level: Not on file Occupational History  Not on file Social Needs  Financial resource strain: Not on file  Food insecurity:  
  Worry: Not on file Inability: Not on file  Transportation needs:  
  Medical: Not on file Non-medical: Not on file Tobacco Use  Smoking status: Never Smoker  Smokeless tobacco: Never Used Substance and Sexual Activity  Alcohol use: Yes Alcohol/week: 0.0 oz  
  Comment: rarely  Drug use: No  
 Sexual activity: Yes  
  Partners: Female Lifestyle  Physical activity:  
  Days per week: Not on file Minutes per session: Not on file  Stress: Not on file Relationships  Social connections:  
  Talks on phone: Not on file Gets together: Not on file Attends Anabaptism service: Not on file Active member of club or organization: Not on file Attends meetings of clubs or organizations: Not on file Relationship status: Not on file  Intimate partner violence:  
  Fear of current or ex partner: Not on file Emotionally abused: Not on file Physically abused: Not on file Forced sexual activity: Not on file Other Topics Concern  Not on file Social History Narrative  Not on file ALLERGIES: Levaquin [levofloxacin]; Ciprofloxacin; Pcn [penicillins]; and Sulfa (sulfonamide antibiotics) Review of Systems Unable to perform ROS: Dementia (per spouse/ family ) Constitutional: Positive for activity change and appetite change. Negative for chills and fever. HENT: Positive for trouble swallowing. Negative for congestion, rhinorrhea and voice change. Respiratory: Positive for cough (productive ) and shortness of breath. Cardiovascular: Negative for chest pain. Gastrointestinal: Negative for abdominal pain, diarrhea and nausea. Genitourinary: Negative for decreased urine volume and difficulty urinating. Musculoskeletal: Negative for arthralgias and joint swelling. Neurological: Negative for dizziness. Psychiatric/Behavioral: Positive for behavioral problems and confusion. Vitals:  
 03/29/19 1547 03/29/19 1830 BP: (!) 153/95 Pulse: 88 88 Resp: 20 16 Temp: 97.5 °F (36.4 °C) SpO2: 94% 100% Weight: 59 kg (130 lb) Height: 5' 10\" (1.778 m) Physical Exam  
Constitutional: He appears well-developed and well-nourished. No distress. HENT:  
Head: Normocephalic and atraumatic. Right Ear: External ear normal.  
Left Ear: External ear normal.  
Nose: Nose normal.  
Gurgling secretions heard, pt coughing to clear secretions Eyes: Pupils are equal, round, and reactive to light. Conjunctivae and EOM are normal.  
Neck: Normal range of motion. Neck supple. Cardiovascular: Normal rate, regular rhythm, normal heart sounds and intact distal pulses. Pulmonary/Chest: Effort normal and breath sounds normal.  
Abdominal: Soft. There is no tenderness. There is no rebound and no guarding. Musculoskeletal: Normal range of motion. Neurological: He is alert. He is disoriented. Skin: Skin is warm and dry. Psychiatric: His speech is slurred. He is withdrawn. Cognition and memory are impaired. He exhibits abnormal recent memory and abnormal remote memory. He is inattentive. Nursing note and vitals reviewed. MDM Number of Diagnoses or Management Options Dementia with behavioral disturbance, unspecified dementia type: Dysphagia, unspecified type:  
Diagnosis management comments: DDx: esophageal mass, dysphagia, advancing dementia, dehydration, UTI 83 yo M w/ advancing dementia- was on hospice- w/ concern for worsening swallowing ability x1mos  
- noted to be placed on increased viscous fluids/ food in last 2-3d per wife 2/2 decreased appetite w/ more choking episodes; discussed that thickened would be safer and likely prevent risk of aspiration  
- discussed findings- CT w/o any obstruction/ CVA; wife states understanding of findings- noted no focal PNA on CXR and no resp distress in the ED; labs reassuring  
- discussed options for further care w/ wife including return to hospice and or GI f/u- wife stated she will think about which decisions she would like to make - pt w/ notable improvement of gurgling cough post suction and scopolamine patch application- given Rx for scopo  
-reasons to return to ED provided to pt wife Amount and/or Complexity of Data Reviewed Clinical lab tests: ordered and reviewed Tests in the radiology section of CPT®: ordered and reviewed Review and summarize past medical records: yes Discuss the patient with other providers: yes Laly Cordero MD ) Procedures LABORATORY TESTS: 
Recent Results (from the past 12 hour(s)) CBC WITH AUTOMATED DIFF Collection Time: 03/29/19  5:29 PM  
Result Value Ref Range WBC 14.5 (H) 4.1 - 11.1 K/uL  
 RBC 4.23 4. 10 - 5.70 M/uL  
 HGB 13.7 12.1 - 17.0 g/dL HCT 42.5 36.6 - 50.3 % .5 (H) 80.0 - 99.0 FL  
 MCH 32.4 26.0 - 34.0 PG  
 MCHC 32.2 30.0 - 36.5 g/dL  
 RDW 14.0 11.5 - 14.5 % PLATELET 947 (H) 540 - 400 K/uL MPV 8.8 (L) 8.9 - 12.9 FL  
 NRBC 0.0 0  WBC ABSOLUTE NRBC 0.00 0.00 - 0.01 K/uL NEUTROPHILS 91 (H) 32 - 75 % LYMPHOCYTES 4 (L) 12 - 49 % MONOCYTES 5 5 - 13 % EOSINOPHILS 0 0 - 7 % BASOPHILS 0 0 - 1 % IMMATURE GRANULOCYTES 0 0.0 - 0.5 % ABS. NEUTROPHILS 13.2 (H) 1.8 - 8.0 K/UL  
 ABS. LYMPHOCYTES 0.6 (L) 0.8 - 3.5 K/UL  
 ABS. MONOCYTES 0.7 0.0 - 1.0 K/UL  
 ABS. EOSINOPHILS 0.0 0.0 - 0.4 K/UL  
 ABS. BASOPHILS 0.0 0.0 - 0.1 K/UL  
 ABS. IMM. GRANS. 0.0 0.00 - 0.04 K/UL  
 DF AUTOMATED    
 RBC COMMENTS MACROCYTOSIS 
1+ METABOLIC PANEL, BASIC  
 Collection Time: 03/29/19  5:29 PM  
Result Value Ref Range Sodium 140 136 - 145 mmol/L Potassium 4.2 3.5 - 5.1 mmol/L Chloride 106 97 - 108 mmol/L  
 CO2 29 21 - 32 mmol/L Anion gap 5 5 - 15 mmol/L Glucose 94 65 - 100 mg/dL BUN 14 6 - 20 MG/DL Creatinine 0.73 0.70 - 1.30 MG/DL  
 BUN/Creatinine ratio 19 12 - 20 GFR est AA >60 >60 ml/min/1.73m2 GFR est non-AA >60 >60 ml/min/1.73m2 Calcium 8.3 (L) 8.5 - 10.1 MG/DL  
SAMPLES BEING HELD Collection Time: 03/29/19  5:29 PM  
Result Value Ref Range SAMPLES BEING HELD BLUE,GOLD COMMENT Add-on orders for these samples will be processed based on acceptable specimen integrity and analyte stability, which may vary by analyte. IMAGING RESULTS: 
CT NECK SOFT TISSUE W CONT Final Result IMPRESSION:    
1. There is material in the cervical esophagus at the thoracic inlet which may  
represent retained enteric content. There is no definite obstructing lesion with  
some concentric mural thickening throughout the visible esophagus. Consider Zenker's diverticulum. 2. Lamellated calculus in the left floor of mouth. Calculi in the right floor  
the mouth. Consider the possibility of sialoliths. 3. Incidental findings as above CT HEAD WO CONT Final Result IMPRESSION:   
1. There is thickening and increased attenuation in the scalp overlying the  
forehead suggesting contusion. 2. Questionable hygroma overlying the anterior, left frontal convexity. XR CHEST PORT Final Result IMPRESSION: No acute findings. MEDICATIONS GIVEN: 
Medications  
scopolamine (TRANSDERM-SCOP) 1 mg over 3 days 1 Patch (1 Patch TransDERmal Apply Patch 3/29/19 1640)  
sodium chloride 0.9 % bolus infusion 1,000 mL (0 mL IntraVENous IV Completed 3/29/19 1904) iopamidol (ISOVUE 300) 61 % contrast injection 100 mL (100 mL IntraVENous Given 3/29/19 1700) IMPRESSION: 
1. Dysphagia, unspecified type 2. Dementia with behavioral disturbance, unspecified dementia type PLAN: 
1. Discharge Medication List as of 3/29/2019  6:38 PM  
  
START taking these medications Details  
scopolamine (TRANSDERM-SCOP) 1 mg over 3 days pt3d 1 Patch by TransDERmal route every seventy-two (72) hours. , Print, Disp-12 Patch, R-0  
  
  
CONTINUE these medications which have NOT CHANGED Details  
traMADol (ULTRAM) 50 mg tablet Take 1 Tab by mouth two (2) times a day for 30 days. Max Daily Amount: 100 mg., Print, Disp-60 Tab, R-2  
  
QUEtiapine (SEROQUEL) 50 mg tablet 25 mg or 50 mg at HS, No Print, Disp-60 Tab, R-2  
  
!! VENTOLIN HFA 90 mcg/actuation inhaler INHALE 2 PUFFS BY MOUTH EVERY 4 HOURS AS NEEDED, Normal, Disp-1 Inhaler, R-11  
  
traZODone (DESYREL) 100 mg tablet Take 1 Tab by mouth nightly., Normal, Disp-30 Tab, R-5  
  
atorvastatin (LIPITOR) 10 mg tablet Take 10 mg by mouth., Historical Med  
  
diclofenac (VOLTAREN) 1 % gel as needed., Historical Med  
  
hydrocortisone (HYTONE) 2.5 % topical cream as needed., Historical Med  
  
citalopram (CELEXA) 20 mg tablet Take 1 Tab by mouth daily. , Normal, Disp-30 Tab, R-5  
  
donepezil (ARICEPT) 10 mg tablet Take 1 Tab by mouth nightly. Indications: MODERATE TO SEVERE ALZHEIMER'S TYPE DEMENTIA, Normal, Disp-90 Tab, R-3  
  
memantine (NAMENDA) 10 mg tablet Take 1 Tab by mouth daily. Indications: MODERATE TO SEVERE ALZHEIMER'S TYPE DEMENTIA, Normal, Disp-180 Tab, R-3  
  
polyethylene glycol (MIRALAX) 17 gram packet Take 17 g by mouth daily as needed., Historical Med  
  
inulin (FIBER CHOICE, INULIN,) 1.5 gram chew Take 1 Tab by mouth daily. , Historical Med  
  
acetaminophen (TYLENOL) 325 mg tablet Take 650 mg by mouth every six (6) hours as needed for Pain., Historical Med  
  
!! albuterol (PROVENTIL HFA, VENTOLIN HFA, PROAIR HFA) 90 mcg/actuation inhaler Take 2 Puffs by inhalation every four (4) hours as needed. , Print, Disp-1 Inhaler, R-11  
  
 albuterol (PROVENTIL VENTOLIN) 2.5 mg /3 mL (0.083 %) nebulizer solution 3 mL by Nebulization route every four (4) hours as needed. For wheezing    **[id number]**, Print, Disp-1 Package, R-5  
  
budesonide-formoterol (SYMBICORT) 160-4.5 mcg/actuation HFAA Take 2 Puffs by inhalation two (2) times a day., Print, Disp-1 Inhaler, R-5  
  
cholecalciferol (VITAMIN D3) 1,000 unit tablet Take 1 Tab by mouth daily. , Print, Disp-30 Tab, R-5  
  
 !! - Potential duplicate medications found. Please discuss with provider. 2.  
Follow-up Information Follow up With Specialties Details Why Contact Info Clement Reynoso MD Internal Medicine Schedule an appointment as soon as possible for a visit  [id number] [address] [facility] [address] 
[phone] [facility] EMERGENCY DEPT Emergency Medicine Go to As needed, If symptoms worsen [address] 
[phone] Toña Lagos MD Gastroenterology Go to As needed for GI follow up  [address] 
[phone] 3. Return to ED if worse

## 2019-03-29 NOTE — DISCHARGE INSTRUCTIONS
Patient Education        Dementia: Care Instructions  Your Care Instructions    Dementia is a loss of mental skills that affects your daily life. It is different than the occasional trouble with memory that is part of aging. You may find it hard to remember things that you feel you should be able to remember. Or you may feel that your mind is just not working as well as usual.  Finding out that you have dementia is a shock. You may be afraid and worried about how the condition will change your life. Although there is no cure at this time, medicine may slow memory loss and improve thinking for a while. Other medicines may be able to help you sleep or cope with depression and behavior changes. Dementia often gets worse slowly. But it can get worse quickly. As dementia gets worse, it may become harder to do common things that take planning, like making a list and going shopping. Over time, the disease may make it hard for you to take care of yourself. Some people with dementia need others to help care for them. Dementia is different for everyone. You may be able to function well for a long time. In the early stage of the condition, you can do things at home to make life easier and safer. You also can keep doing your hobbies and other activities. Many people find comfort in planning now for their future needs. Follow-up care is a key part of your treatment and safety. Be sure to make and go to all appointments, and call your doctor if you are having problems. It's also a good idea to know your test results and keep a list of the medicines you take. How can you care for yourself at home? · Take your medicines exactly as prescribed. Call your doctor if you think you are having a problem with your medicine. · Eat healthy foods. Eat lots of whole grains, fruits, and vegetables every day.  If you are not hungry, try snacks or nutritional drinks such as Boost, Ensure, or Sustacal.  · If you have problems sleeping:  ? Try not to nap too close to your bedtime. ? Exercise regularly. Walking is a good choice. ? Try a glass of warm milk or caffeine-free herbal tea before bed. · Do tasks and activities during the time of day when you feel your best. It may help to develop a daily routine. · Post labels, lists, and sticky notes to help you remember things. Write your activities on a calendar you can easily find. Put your clock where you can easily see it. · Stay active. Take walks in familiar places, or with friends or loved ones. Try to stay active mentally too. Read and work crossword puzzles if you enjoy these activities. · Do not drive unless you can pass an on-road driving test. If you are not sure if you are safe to drive, your state 's license bureau can test you. · Keep a cordless phone and a flashlight with new batteries by your bed. If possible, put a phone in each of the main rooms of your house, or carry a cell phone in case you fall and cannot reach a phone. Or, you can wear a device around your neck or wrist. You push a button that sends a signal for help. Acknowledge your emotions and plan for the future  · Talk openly and honestly with your doctor. · Let yourself grieve. It is common to feel angry, scared, frustrated, anxious, or depressed. · Get emotional support from family, friends, a support group, or a counselor experienced in working with people who have dementia. · Ask for help if you need it. · Plan for the future. ? Talk to your family and doctor about preparing a living will and other important papers while you can make decisions. These papers tell your doctors how to care for you at the end of your life. ? Consider naming a person to make decisions about your care if you are not able to. When should you call for help? Call 911 anytime you think you may need emergency care.  For example, call if:    · You are lost and do not know whom to call.     · You are injured and do not know whom to call.    Call your doctor now or seek immediate medical care if:    · You are more confused or upset than usual.     · You feel like you could hurt yourself because your mind is not working well.   Nathan Edwardsro closely for changes in your health, and be sure to contact your doctor if you have any problems. Where can you learn more? Go to http://willem-maricel.info/. Enter T375 in the search box to learn more about \"Dementia: Care Instructions. \"  Current as of: September 11, 2018  Content Version: 11.9  © 4499-1314 [employer]. Care instructions adapted under license by [employer] (which disclaims liability or warranty for this information). If you have questions about a medical condition or this instruction, always ask your healthcare professional. [address] any warranty or liability for your use of this information. Patient Education        Learning About the Diet for Swallowing Problems  What are swallowing problems? Difficulty swallowing is also called dysphagia (say \"[id number]\"). It is most often a sign of a problem with your throat or esophagus. This is the tube that moves food and liquids from the back of your mouth to your stomach. Trouble swallowing can occur when the muscles and nerves that move food through the throat and esophagus are not working right. To help you swallow food, your doctor or speech therapist may advise a special dysphagia diet for you. Why is a special diet important? A dysphagia diet can help you handle some problems that can occur when it's hard to swallow food and liquids easily. These problems can include:  · Malnutrition. This means you aren't getting enough healthy foods to keep your body working well. · Dehydration. This means you aren't getting enough liquids to keep your body healthy. · Aspiration.  This means that food, liquid, or saliva goes down your windpipe (trachea) into your lungs, instead of down your esophagus to your stomach. This can lead to aspiration pneumonia, which is an inflammation of the lungs. What is the dysphagia diet? In the dysphagia diet, you change the foods you eat and the liquids you drink to make it easier to swallow them. You may:  · Change the texture of the foods you eat. Your doctor or speech therapist may advise you to eat one of these types of foods:  ? Solid, soft foods that have been cut up into small pieces. Extra gravy or sauce can help make these foods easier to swallow. ? Semi-solid foods, like ground meats or fruits and vegetables that are mashed with a fork. These foods require some chewing. Extra gravy or sauce is often served. ? Foods that are the texture of pudding. You don't have to chew these foods. Examples include mashed potatoes with gravy; yogurt; pudding; and pureed meats, fruits, and vegetables. · Thicken the liquids you drink. Your doctor or speech therapist will tell you what kind of thickener to use and how thick to make the liquids. ? Nectar-thick liquids leave a thin coating when they are poured from a spoon. ? Honey-thick liquids drip slowly when they are poured from a spoon. ? Pudding-thick liquids do not pour from a spoon. Your speech therapist will help you learn exercises to train your muscles to work together so you can swallow. You may also need to learn how to position your body or how to put food in your mouth to be able to swallow better. Some people have severe trouble swallowing and can't get enough food and liquids. In those cases, the doctor can insert a feeding tube so the person gets enough nutrients. Follow-up care is a key part of your treatment and safety. Be sure to make and go to all appointments, and call your doctor if you are having problems. It's also a good idea to know your test results and keep a list of the medicines you take. Where can you learn more?   Go to http://willem-maricel.info/. Enter M402 in the search box to learn more about \"Learning About the Diet for Swallowing Problems. \"  Current as of: September 23, 2018  Content Version: 11.9  © 3279-4754 [employer]. Care instructions adapted under license by [employer] (which disclaims liability or warranty for this information). If you have questions about a medical condition or this instruction, always ask your healthcare professional. [address] any warranty or liability for your use of this information.

## 2019-03-29 NOTE — ED TRIAGE NOTES
Pt here with decrease ability to swallow. Pt with garbled speech. Family states he has been spitting out secretions.

## 2023-04-25 NOTE — TELEPHONE ENCOUNTER
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

## 2025-04-03 NOTE — TELEPHONE ENCOUNTER
----- Message from Mayela Guerra LPN sent at 2/88/5107  4:09 PM EDT -----  Writer spoke with patient's wife (Fabiana Wilkinson) and informed of both xray's done per Vickie Hodgkin verbalized understanding. Latasha Palmer would like to know if she should put her  on the prednisone that she has on hand for patient per Dr. Kenia Baez?
Writer contacted patient's wife and gave her instruction to start the prednisone per Hernandez Goodrich.
Yes start the prednisone. Please thank you.
Sepsis Criteria were met: